# Patient Record
Sex: MALE | Race: WHITE | NOT HISPANIC OR LATINO
[De-identification: names, ages, dates, MRNs, and addresses within clinical notes are randomized per-mention and may not be internally consistent; named-entity substitution may affect disease eponyms.]

---

## 2017-01-17 ENCOUNTER — APPOINTMENT (OUTPATIENT)
Dept: COLORECTAL SURGERY | Facility: CLINIC | Age: 77
End: 2017-01-17

## 2017-01-24 ENCOUNTER — RX RENEWAL (OUTPATIENT)
Age: 77
End: 2017-01-24

## 2017-06-13 ENCOUNTER — RX RENEWAL (OUTPATIENT)
Age: 77
End: 2017-06-13

## 2017-08-10 ENCOUNTER — RX RENEWAL (OUTPATIENT)
Age: 77
End: 2017-08-10

## 2017-09-15 ENCOUNTER — APPOINTMENT (OUTPATIENT)
Dept: FAMILY MEDICINE | Facility: CLINIC | Age: 77
End: 2017-09-15
Payer: MEDICARE

## 2017-09-15 ENCOUNTER — NON-APPOINTMENT (OUTPATIENT)
Age: 77
End: 2017-09-15

## 2017-09-15 VITALS
DIASTOLIC BLOOD PRESSURE: 70 MMHG | SYSTOLIC BLOOD PRESSURE: 122 MMHG | BODY MASS INDEX: 29.03 KG/M2 | HEART RATE: 58 BPM | WEIGHT: 196 LBS | TEMPERATURE: 98.2 F | OXYGEN SATURATION: 98 % | HEIGHT: 69 IN

## 2017-09-15 DIAGNOSIS — N28.1 CYST OF KIDNEY, ACQUIRED: ICD-10-CM

## 2017-09-15 DIAGNOSIS — I65.22 OCCLUSION AND STENOSIS OF LEFT CAROTID ARTERY: ICD-10-CM

## 2017-09-15 DIAGNOSIS — Z85.048 PERSONAL HISTORY OF OTHER MALIGNANT NEOPLASM OF RECTUM, RECTOSIGMOID JUNCTION, AND ANUS: ICD-10-CM

## 2017-09-15 DIAGNOSIS — E55.9 VITAMIN D DEFICIENCY, UNSPECIFIED: ICD-10-CM

## 2017-09-15 DIAGNOSIS — Z23 ENCOUNTER FOR IMMUNIZATION: ICD-10-CM

## 2017-09-15 PROCEDURE — G0008: CPT

## 2017-09-15 PROCEDURE — 90662 IIV NO PRSV INCREASED AG IM: CPT

## 2017-09-15 PROCEDURE — G0009: CPT

## 2017-09-15 PROCEDURE — 90670 PCV13 VACCINE IM: CPT

## 2017-09-15 PROCEDURE — G0439: CPT

## 2017-09-15 RX ORDER — CHLORHEXIDINE GLUCONATE, 0.12% ORAL RINSE 1.2 MG/ML
0.12 SOLUTION DENTAL
Qty: 473 | Refills: 0 | Status: COMPLETED | COMMUNITY
Start: 2017-03-29

## 2017-09-17 LAB
25(OH)D3 SERPL-MCNC: 50.1 NG/ML
ALBUMIN SERPL ELPH-MCNC: 4.6 G/DL
ALP BLD-CCNC: 106 U/L
ALT SERPL-CCNC: 30 U/L
ANION GAP SERPL CALC-SCNC: 21 MMOL/L
APPEARANCE: CLEAR
AST SERPL-CCNC: 28 U/L
BACTERIA: NEGATIVE
BASOPHILS # BLD AUTO: 0.02 K/UL
BASOPHILS NFR BLD AUTO: 0.3 %
BILIRUB SERPL-MCNC: 0.6 MG/DL
BILIRUBIN URINE: NEGATIVE
BLOOD URINE: NEGATIVE
BUN SERPL-MCNC: 28 MG/DL
CALCIUM SERPL-MCNC: 9.7 MG/DL
CEA SERPL-MCNC: 2.4 NG/ML
CHLORIDE SERPL-SCNC: 104 MMOL/L
CHOLEST SERPL-MCNC: 184 MG/DL
CHOLEST/HDLC SERPL: 1.9 RATIO
CO2 SERPL-SCNC: 20 MMOL/L
COLOR: YELLOW
CREAT SERPL-MCNC: 1.15 MG/DL
EOSINOPHIL # BLD AUTO: 0.18 K/UL
EOSINOPHIL NFR BLD AUTO: 2.8 %
GLUCOSE QUALITATIVE U: NORMAL MG/DL
GLUCOSE SERPL-MCNC: 106 MG/DL
HBA1C MFR BLD HPLC: 5.8 %
HCT VFR BLD CALC: 43.7 %
HDLC SERPL-MCNC: 96 MG/DL
HGB BLD-MCNC: 14.1 G/DL
IMM GRANULOCYTES NFR BLD AUTO: 0.2 %
KETONES URINE: NEGATIVE
LDLC SERPL CALC-MCNC: 76 MG/DL
LEUKOCYTE ESTERASE URINE: NEGATIVE
LYMPHOCYTES # BLD AUTO: 1.41 K/UL
LYMPHOCYTES NFR BLD AUTO: 22.1 %
MAN DIFF?: NORMAL
MCHC RBC-ENTMCNC: 30.4 PG
MCHC RBC-ENTMCNC: 32.3 GM/DL
MCV RBC AUTO: 94.2 FL
MICROSCOPIC-UA: NORMAL
MONOCYTES # BLD AUTO: 0.55 K/UL
MONOCYTES NFR BLD AUTO: 8.6 %
NEUTROPHILS # BLD AUTO: 4.21 K/UL
NEUTROPHILS NFR BLD AUTO: 66 %
NITRITE URINE: NEGATIVE
PH URINE: 5.5
PLATELET # BLD AUTO: 173 K/UL
POTASSIUM SERPL-SCNC: 4.9 MMOL/L
PROT SERPL-MCNC: 7.5 G/DL
PROTEIN URINE: ABNORMAL MG/DL
PSA FREE FLD-MCNC: 15.1
PSA FREE SERPL-MCNC: 0.11 NG/ML
PSA SERPL-MCNC: 0.73 NG/ML
RBC # BLD: 4.64 M/UL
RBC # FLD: 14.2 %
RED BLOOD CELLS URINE: 1 /HPF
SODIUM SERPL-SCNC: 145 MMOL/L
SPECIFIC GRAVITY URINE: 1.02
SQUAMOUS EPITHELIAL CELLS: 1 /HPF
TRIGL SERPL-MCNC: 62 MG/DL
TSH SERPL-ACNC: 3.01 UIU/ML
URATE SERPL-MCNC: 8.2 MG/DL
UROBILINOGEN URINE: NORMAL MG/DL
WBC # FLD AUTO: 6.38 K/UL
WHITE BLOOD CELLS URINE: 1 /HPF

## 2017-09-29 ENCOUNTER — APPOINTMENT (OUTPATIENT)
Dept: COLORECTAL SURGERY | Facility: CLINIC | Age: 77
End: 2017-09-29
Payer: MEDICARE

## 2017-09-29 VITALS
SYSTOLIC BLOOD PRESSURE: 120 MMHG | BODY MASS INDEX: 29.03 KG/M2 | HEART RATE: 64 BPM | DIASTOLIC BLOOD PRESSURE: 72 MMHG | HEIGHT: 69 IN | TEMPERATURE: 97.9 F | WEIGHT: 196 LBS

## 2017-09-29 DIAGNOSIS — Z85.048 PERSONAL HISTORY OF OTHER MALIGNANT NEOPLASM OF RECTUM, RECTOSIGMOID JUNCTION, AND ANUS: ICD-10-CM

## 2017-09-29 PROCEDURE — 99214 OFFICE O/P EST MOD 30 MIN: CPT

## 2017-10-05 ENCOUNTER — FORM ENCOUNTER (OUTPATIENT)
Age: 77
End: 2017-10-05

## 2017-10-06 ENCOUNTER — OUTPATIENT (OUTPATIENT)
Dept: OUTPATIENT SERVICES | Facility: HOSPITAL | Age: 77
LOS: 1 days | End: 2017-10-06
Payer: MEDICARE

## 2017-10-06 ENCOUNTER — APPOINTMENT (OUTPATIENT)
Dept: CT IMAGING | Facility: CLINIC | Age: 77
End: 2017-10-06
Payer: MEDICARE

## 2017-10-06 DIAGNOSIS — Z00.8 ENCOUNTER FOR OTHER GENERAL EXAMINATION: ICD-10-CM

## 2017-10-06 PROCEDURE — 74177 CT ABD & PELVIS W/CONTRAST: CPT | Mod: 26

## 2017-10-06 PROCEDURE — 82565 ASSAY OF CREATININE: CPT

## 2017-10-06 PROCEDURE — 74177 CT ABD & PELVIS W/CONTRAST: CPT

## 2017-10-10 ENCOUNTER — OTHER (OUTPATIENT)
Age: 77
End: 2017-10-10

## 2017-10-10 LAB
ALBUMIN SERPL ELPH-MCNC: 4.1 G/DL
ALP BLD-CCNC: 80 U/L
ALT SERPL-CCNC: 25 U/L
ANION GAP SERPL CALC-SCNC: 17 MMOL/L
AST SERPL-CCNC: 24 U/L
BASOPHILS # BLD AUTO: 0.02 K/UL
BASOPHILS NFR BLD AUTO: 0.4 %
BILIRUB DIRECT SERPL-MCNC: 0.2 MG/DL
BILIRUB INDIRECT SERPL-MCNC: 0.5 MG/DL
BILIRUB SERPL-MCNC: 0.7 MG/DL
BUN SERPL-MCNC: 24 MG/DL
CALCIUM SERPL-MCNC: 9.8 MG/DL
CEA SERPL-MCNC: 2.4 NG/ML
CHLORIDE SERPL-SCNC: 99 MMOL/L
CO2 SERPL-SCNC: 23 MMOL/L
CREAT SERPL-MCNC: 1.12 MG/DL
EOSINOPHIL # BLD AUTO: 0.23 K/UL
EOSINOPHIL NFR BLD AUTO: 4.2 %
GLUCOSE SERPL-MCNC: 106 MG/DL
HCT VFR BLD CALC: 41.4 %
HGB BLD-MCNC: 13.5 G/DL
IMM GRANULOCYTES NFR BLD AUTO: 0.5 %
LYMPHOCYTES # BLD AUTO: 1.18 K/UL
LYMPHOCYTES NFR BLD AUTO: 21.5 %
MAN DIFF?: NORMAL
MCHC RBC-ENTMCNC: 30.8 PG
MCHC RBC-ENTMCNC: 32.6 GM/DL
MCV RBC AUTO: 94.3 FL
MONOCYTES # BLD AUTO: 0.51 K/UL
MONOCYTES NFR BLD AUTO: 9.3 %
NEUTROPHILS # BLD AUTO: 3.53 K/UL
NEUTROPHILS NFR BLD AUTO: 64.1 %
PLATELET # BLD AUTO: 128 K/UL
POTASSIUM SERPL-SCNC: 4.8 MMOL/L
PROT SERPL-MCNC: 6.9 G/DL
RBC # BLD: 4.39 M/UL
RBC # FLD: 13.4 %
SODIUM SERPL-SCNC: 139 MMOL/L
WBC # FLD AUTO: 5.5 K/UL

## 2017-12-06 ENCOUNTER — INPATIENT (INPATIENT)
Facility: HOSPITAL | Age: 77
LOS: 6 days | Discharge: HOME | End: 2017-12-13
Attending: COLON & RECTAL SURGERY | Admitting: COLON & RECTAL SURGERY
Payer: MEDICARE

## 2017-12-06 VITALS
RESPIRATION RATE: 16 BRPM | WEIGHT: 186.95 LBS | TEMPERATURE: 99 F | HEART RATE: 75 BPM | OXYGEN SATURATION: 99 % | DIASTOLIC BLOOD PRESSURE: 94 MMHG | HEIGHT: 69 IN | SYSTOLIC BLOOD PRESSURE: 168 MMHG

## 2017-12-06 DIAGNOSIS — K56.609 UNSPECIFIED INTESTINAL OBSTRUCTION, UNSPECIFIED AS TO PARTIAL VERSUS COMPLETE OBSTRUCTION: ICD-10-CM

## 2017-12-06 LAB
ALBUMIN SERPL ELPH-MCNC: 3.9 G/DL — SIGNIFICANT CHANGE UP (ref 3.3–5)
ALP SERPL-CCNC: 106 U/L — SIGNIFICANT CHANGE UP (ref 40–120)
ALT FLD-CCNC: 32 U/L — SIGNIFICANT CHANGE UP (ref 12–78)
ANION GAP SERPL CALC-SCNC: 8 MMOL/L — SIGNIFICANT CHANGE UP (ref 5–17)
APPEARANCE UR: CLEAR — SIGNIFICANT CHANGE UP
APTT BLD: 30.2 SEC — SIGNIFICANT CHANGE UP (ref 27.5–37.4)
AST SERPL-CCNC: 24 U/L — SIGNIFICANT CHANGE UP (ref 15–37)
BACTERIA # UR AUTO: (no result)
BASOPHILS # BLD AUTO: 0.1 K/UL — SIGNIFICANT CHANGE UP (ref 0–0.2)
BASOPHILS NFR BLD AUTO: 0.4 % — SIGNIFICANT CHANGE UP (ref 0–2)
BILIRUB SERPL-MCNC: 0.9 MG/DL — SIGNIFICANT CHANGE UP (ref 0.2–1.2)
BILIRUB UR-MCNC: NEGATIVE — SIGNIFICANT CHANGE UP
BLD GP AB SCN SERPL QL: SIGNIFICANT CHANGE UP
BUN SERPL-MCNC: 22 MG/DL — SIGNIFICANT CHANGE UP (ref 7–23)
CALCIUM SERPL-MCNC: 9.4 MG/DL — SIGNIFICANT CHANGE UP (ref 8.5–10.1)
CHLORIDE SERPL-SCNC: 103 MMOL/L — SIGNIFICANT CHANGE UP (ref 96–108)
CO2 SERPL-SCNC: 28 MMOL/L — SIGNIFICANT CHANGE UP (ref 22–31)
COLOR SPEC: YELLOW — SIGNIFICANT CHANGE UP
CREAT SERPL-MCNC: 0.93 MG/DL — SIGNIFICANT CHANGE UP (ref 0.5–1.3)
DIFF PNL FLD: (no result)
EOSINOPHIL # BLD AUTO: 0 K/UL — SIGNIFICANT CHANGE UP (ref 0–0.5)
EOSINOPHIL NFR BLD AUTO: 0.1 % — SIGNIFICANT CHANGE UP (ref 0–6)
EPI CELLS # UR: SIGNIFICANT CHANGE UP
GLUCOSE SERPL-MCNC: 109 MG/DL — HIGH (ref 70–99)
GLUCOSE UR QL: NEGATIVE MG/DL — SIGNIFICANT CHANGE UP
HCT VFR BLD CALC: 47.2 % — SIGNIFICANT CHANGE UP (ref 39–50)
HGB BLD-MCNC: 16.1 G/DL — SIGNIFICANT CHANGE UP (ref 13–17)
INR BLD: 0.97 RATIO — SIGNIFICANT CHANGE UP (ref 0.88–1.16)
KETONES UR-MCNC: (no result)
LACTATE SERPL-SCNC: 1.2 MMOL/L — SIGNIFICANT CHANGE UP (ref 0.7–2)
LEUKOCYTE ESTERASE UR-ACNC: NEGATIVE — SIGNIFICANT CHANGE UP
LIDOCAIN IGE QN: 105 U/L — SIGNIFICANT CHANGE UP (ref 73–393)
LYMPHOCYTES # BLD AUTO: 1.5 K/UL — SIGNIFICANT CHANGE UP (ref 1–3.3)
LYMPHOCYTES # BLD AUTO: 12.7 % — LOW (ref 13–44)
MAGNESIUM SERPL-MCNC: 2.1 MG/DL — SIGNIFICANT CHANGE UP (ref 1.6–2.6)
MCHC RBC-ENTMCNC: 31.5 PG — SIGNIFICANT CHANGE UP (ref 27–34)
MCHC RBC-ENTMCNC: 34.1 GM/DL — SIGNIFICANT CHANGE UP (ref 32–36)
MCV RBC AUTO: 92.4 FL — SIGNIFICANT CHANGE UP (ref 80–100)
MONOCYTES # BLD AUTO: 0.8 K/UL — SIGNIFICANT CHANGE UP (ref 0–0.9)
MONOCYTES NFR BLD AUTO: 6.8 % — SIGNIFICANT CHANGE UP (ref 2–14)
NEUTROPHILS # BLD AUTO: 9.4 K/UL — HIGH (ref 1.8–7.4)
NEUTROPHILS NFR BLD AUTO: 79.9 % — HIGH (ref 43–77)
NITRITE UR-MCNC: NEGATIVE — SIGNIFICANT CHANGE UP
PH UR: 5 — SIGNIFICANT CHANGE UP (ref 5–8)
PLATELET # BLD AUTO: 146 K/UL — LOW (ref 150–400)
POTASSIUM SERPL-MCNC: 4 MMOL/L — SIGNIFICANT CHANGE UP (ref 3.5–5.3)
POTASSIUM SERPL-SCNC: 4 MMOL/L — SIGNIFICANT CHANGE UP (ref 3.5–5.3)
PROT SERPL-MCNC: 8 GM/DL — SIGNIFICANT CHANGE UP (ref 6–8.3)
PROT UR-MCNC: 100 MG/DL
PROTHROM AB SERPL-ACNC: 10.5 SEC — SIGNIFICANT CHANGE UP (ref 9.8–12.7)
RBC # BLD: 5.1 M/UL — SIGNIFICANT CHANGE UP (ref 4.2–5.8)
RBC # FLD: 12.2 % — SIGNIFICANT CHANGE UP (ref 10.3–14.5)
RBC CASTS # UR COMP ASSIST: (no result) /HPF (ref 0–4)
SODIUM SERPL-SCNC: 139 MMOL/L — SIGNIFICANT CHANGE UP (ref 135–145)
SP GR SPEC: 1.01 — SIGNIFICANT CHANGE UP (ref 1.01–1.02)
TYPE + AB SCN PNL BLD: SIGNIFICANT CHANGE UP
UROBILINOGEN FLD QL: NEGATIVE MG/DL — SIGNIFICANT CHANGE UP
WBC # BLD: 11.7 K/UL — HIGH (ref 3.8–10.5)
WBC # FLD AUTO: 11.7 K/UL — HIGH (ref 3.8–10.5)
WBC UR QL: SIGNIFICANT CHANGE UP

## 2017-12-06 PROCEDURE — 99285 EMERGENCY DEPT VISIT HI MDM: CPT

## 2017-12-06 PROCEDURE — 74022 RADEX COMPL AQT ABD SERIES: CPT | Mod: 26

## 2017-12-06 RX ORDER — AMLODIPINE BESYLATE 2.5 MG/1
1 TABLET ORAL
Qty: 0 | Refills: 0 | COMMUNITY

## 2017-12-06 RX ORDER — ATENOLOL 25 MG/1
50 TABLET ORAL DAILY
Qty: 0 | Refills: 0 | Status: DISCONTINUED | OUTPATIENT
Start: 2017-12-06 | End: 2017-12-13

## 2017-12-06 RX ORDER — MORPHINE SULFATE 50 MG/1
4 CAPSULE, EXTENDED RELEASE ORAL ONCE
Qty: 0 | Refills: 0 | Status: DISCONTINUED | OUTPATIENT
Start: 2017-12-06 | End: 2017-12-06

## 2017-12-06 RX ORDER — MORPHINE SULFATE 50 MG/1
4 CAPSULE, EXTENDED RELEASE ORAL EVERY 4 HOURS
Qty: 0 | Refills: 0 | Status: DISCONTINUED | OUTPATIENT
Start: 2017-12-06 | End: 2017-12-13

## 2017-12-06 RX ORDER — HEPARIN SODIUM 5000 [USP'U]/ML
5000 INJECTION INTRAVENOUS; SUBCUTANEOUS EVERY 12 HOURS
Qty: 0 | Refills: 0 | Status: DISCONTINUED | OUTPATIENT
Start: 2017-12-07 | End: 2017-12-13

## 2017-12-06 RX ORDER — MORPHINE SULFATE 50 MG/1
2 CAPSULE, EXTENDED RELEASE ORAL EVERY 4 HOURS
Qty: 0 | Refills: 0 | Status: DISCONTINUED | OUTPATIENT
Start: 2017-12-06 | End: 2017-12-13

## 2017-12-06 RX ORDER — CHOLECALCIFEROL (VITAMIN D3) 125 MCG
1 CAPSULE ORAL
Qty: 0 | Refills: 0 | COMMUNITY

## 2017-12-06 RX ORDER — SODIUM CHLORIDE 9 MG/ML
1000 INJECTION, SOLUTION INTRAVENOUS
Qty: 0 | Refills: 0 | Status: DISCONTINUED | OUTPATIENT
Start: 2017-12-06 | End: 2017-12-12

## 2017-12-06 RX ORDER — ONDANSETRON 8 MG/1
4 TABLET, FILM COATED ORAL EVERY 6 HOURS
Qty: 0 | Refills: 0 | Status: DISCONTINUED | OUTPATIENT
Start: 2017-12-06 | End: 2017-12-13

## 2017-12-06 RX ORDER — ONDANSETRON 8 MG/1
4 TABLET, FILM COATED ORAL ONCE
Qty: 0 | Refills: 0 | Status: COMPLETED | OUTPATIENT
Start: 2017-12-06 | End: 2017-12-06

## 2017-12-06 RX ORDER — ASPIRIN/CALCIUM CARB/MAGNESIUM 324 MG
1 TABLET ORAL
Qty: 0 | Refills: 0 | COMMUNITY

## 2017-12-06 RX ORDER — MAGNESIUM OXIDE 400 MG ORAL TABLET 241.3 MG
1 TABLET ORAL
Qty: 0 | Refills: 0 | COMMUNITY

## 2017-12-06 RX ORDER — ATENOLOL 25 MG/1
1 TABLET ORAL
Qty: 0 | Refills: 0 | COMMUNITY

## 2017-12-06 RX ORDER — BENZOCAINE AND MENTHOL 5; 1 G/100ML; G/100ML
1 LIQUID ORAL EVERY 4 HOURS
Qty: 0 | Refills: 0 | Status: DISCONTINUED | OUTPATIENT
Start: 2017-12-06 | End: 2017-12-13

## 2017-12-06 RX ORDER — ATORVASTATIN CALCIUM 80 MG/1
1 TABLET, FILM COATED ORAL
Qty: 0 | Refills: 0 | COMMUNITY

## 2017-12-06 RX ORDER — CALCIUM CARBONATE 500(1250)
1 TABLET ORAL
Qty: 0 | Refills: 0 | COMMUNITY

## 2017-12-06 RX ORDER — PANTOPRAZOLE SODIUM 20 MG/1
40 TABLET, DELAYED RELEASE ORAL DAILY
Qty: 0 | Refills: 0 | Status: DISCONTINUED | OUTPATIENT
Start: 2017-12-06 | End: 2017-12-13

## 2017-12-06 RX ADMIN — PANTOPRAZOLE SODIUM 40 MILLIGRAM(S): 20 TABLET, DELAYED RELEASE ORAL at 21:26

## 2017-12-06 RX ADMIN — MORPHINE SULFATE 4 MILLIGRAM(S): 50 CAPSULE, EXTENDED RELEASE ORAL at 21:25

## 2017-12-06 RX ADMIN — SODIUM CHLORIDE 125 MILLILITER(S): 9 INJECTION, SOLUTION INTRAVENOUS at 20:10

## 2017-12-06 RX ADMIN — MORPHINE SULFATE 4 MILLIGRAM(S): 50 CAPSULE, EXTENDED RELEASE ORAL at 21:40

## 2017-12-06 RX ADMIN — ONDANSETRON 4 MILLIGRAM(S): 8 TABLET, FILM COATED ORAL at 17:04

## 2017-12-06 RX ADMIN — BENZOCAINE AND MENTHOL 1 LOZENGE: 5; 1 LIQUID ORAL at 21:27

## 2017-12-06 RX ADMIN — MORPHINE SULFATE 4 MILLIGRAM(S): 50 CAPSULE, EXTENDED RELEASE ORAL at 17:04

## 2017-12-06 RX ADMIN — ATENOLOL 50 MILLIGRAM(S): 25 TABLET ORAL at 21:26

## 2017-12-06 NOTE — H&P ADULT - PROBLEM SELECTOR PLAN 1
Admit to Colorectal service. Dr. Razo  npo, IVF, NGT  axr now and in am.  am labs  dvt prophylaxis  PPI IV  Patient fully understands plan, if does not improve,  will need surgery

## 2017-12-06 NOTE — PATIENT PROFILE ADULT. - NS TRANSFER PATIENT BELONGINGS
Cell Phone/PDA (specify)/Jewelry/Money (specify)/leather jacket, shoes socks, necklace, ring, phone /Clothing

## 2017-12-06 NOTE — H&P ADULT - NSHPPHYSICALEXAM_GEN_ALL_CORE
Vital Signs Last 24 Hrs  T(C): 37 (06 Dec 2017 15:29), Max: 37 (06 Dec 2017 15:29)  T(F): 98.6 (06 Dec 2017 15:29), Max: 98.6 (06 Dec 2017 15:29)  HR: 75 (06 Dec 2017 15:29) (75 - 75)  BP: 168/94 (06 Dec 2017 15:29) (168/94 - 168/94)  BP(mean): --  RR: 16 (06 Dec 2017 15:29) (16 - 16)  SpO2: 99% (06 Dec 2017 15:29) (99% - 99%)    Some discomfort, but pleasant  HEENT WNL  NECK FROM  chest clear  cor rrr  abd soft, scars well healed, generalized tenderness.   no masses  ext no edema

## 2017-12-06 NOTE — H&P ADULT - HISTORY OF PRESENT ILLNESS
78 y/o male, transferred from [facility]. He has a hx of rectal cancer,s/p neoadjuvant chemotherapy, LAR with ileostomy, reversal of ileostomy. His generalized abdominal pain started 4 days ago, crampy and wavelike in nature. Has become progressively worse.   Denies fever, No vomiting, some nausea. +BM on monday and flatus this am.  CT and bloodwork performed at [facility].  denies chest pains or SOB

## 2017-12-06 NOTE — ED ADULT TRIAGE NOTE - CHIEF COMPLAINT QUOTE
pt transferred from [facility] to  for Dr. Gutierrez for small bowel obstruction.

## 2017-12-06 NOTE — ED ADULT NURSE REASSESSMENT NOTE - NS ED NURSE REASSESS COMMENT FT1
Pt received 14 Fr NGT tube to right NARES , 500cc brown emesis noted within 30 minutes , Morphine 4mg IV and Zofran 4mg IV given

## 2017-12-06 NOTE — H&P ADULT - NSHPLABSRESULTS_GEN_ALL_CORE
11.7  )-----16------( 146      ( 06 Dec 2017 16:58 )             47.2      PT/INR - ( 06 Dec 2017 16:58 )   PT: 10.5 sec;   INR: 0.97 ratio         PTT - ( 06 Dec 2017 16:58 )  PTT:30.2 sec    Lactate, Blood (12.06.17 @ 16:58)    Lactate, Blood: 1.2 mmol/L      CT done at outside facility shows findings concerning for closed loop mid small bowel obstruction some distended loops of mid small bowel extending to the surgical site in the right. There is fecal material present in the colon    stable 3.9 infrarenal abdominal aortic aneurysm just below bifurcation.

## 2017-12-06 NOTE — ED PROVIDER NOTE - OBJECTIVE STATEMENT
78 yo male PMH HTN, HLD, colon cancer s/p resection, sent here by Dr. Razo for SBO found on CT at [facility].   Reports abdominal pain worsening since Sunday, nausea, and sweats.  States he only has one BM per week.  Has not eaten or drank anything today.  Denies fever, chills, CP, SOB, vomiting. 76 yo male PMH HTN, HLD, colon cancer s/p resection, sent here by Dr. Razo for SBO found on CT at [facility].   Reports abdominal pain worsening since Sunday, nausea, and sweats.  States he only has one BM per week, last one was on Monday.  Has not eaten or drank anything today.  Denies fever, chills, CP, SOB, vomiting.

## 2017-12-07 LAB
ANION GAP SERPL CALC-SCNC: 4 MMOL/L — LOW (ref 5–17)
APTT BLD: 28.1 SEC — SIGNIFICANT CHANGE UP (ref 27.5–37.4)
BASOPHILS # BLD AUTO: 0.1 K/UL — SIGNIFICANT CHANGE UP (ref 0–0.2)
BASOPHILS NFR BLD AUTO: 0.9 % — SIGNIFICANT CHANGE UP (ref 0–2)
BLD GP AB SCN SERPL QL: SIGNIFICANT CHANGE UP
BUN SERPL-MCNC: 25 MG/DL — HIGH (ref 7–23)
CALCIUM SERPL-MCNC: 8.7 MG/DL — SIGNIFICANT CHANGE UP (ref 8.5–10.1)
CHLORIDE SERPL-SCNC: 104 MMOL/L — SIGNIFICANT CHANGE UP (ref 96–108)
CO2 SERPL-SCNC: 32 MMOL/L — HIGH (ref 22–31)
CREAT SERPL-MCNC: 1.07 MG/DL — SIGNIFICANT CHANGE UP (ref 0.5–1.3)
EOSINOPHIL # BLD AUTO: 0.1 K/UL — SIGNIFICANT CHANGE UP (ref 0–0.5)
EOSINOPHIL NFR BLD AUTO: 1.3 % — SIGNIFICANT CHANGE UP (ref 0–6)
GLUCOSE SERPL-MCNC: 100 MG/DL — HIGH (ref 70–99)
HCT VFR BLD CALC: 40.3 % — SIGNIFICANT CHANGE UP (ref 39–50)
HGB BLD-MCNC: 13.6 G/DL — SIGNIFICANT CHANGE UP (ref 13–17)
INR BLD: 1.04 RATIO — SIGNIFICANT CHANGE UP (ref 0.88–1.16)
LYMPHOCYTES # BLD AUTO: 1.2 K/UL — SIGNIFICANT CHANGE UP (ref 1–3.3)
LYMPHOCYTES # BLD AUTO: 15.5 % — SIGNIFICANT CHANGE UP (ref 13–44)
MCHC RBC-ENTMCNC: 31.6 PG — SIGNIFICANT CHANGE UP (ref 27–34)
MCHC RBC-ENTMCNC: 33.7 GM/DL — SIGNIFICANT CHANGE UP (ref 32–36)
MCV RBC AUTO: 93.8 FL — SIGNIFICANT CHANGE UP (ref 80–100)
MONOCYTES # BLD AUTO: 0.9 K/UL — SIGNIFICANT CHANGE UP (ref 0–0.9)
MONOCYTES NFR BLD AUTO: 11.4 % — SIGNIFICANT CHANGE UP (ref 2–14)
NEUTROPHILS # BLD AUTO: 5.5 K/UL — SIGNIFICANT CHANGE UP (ref 1.8–7.4)
NEUTROPHILS NFR BLD AUTO: 70.9 % — SIGNIFICANT CHANGE UP (ref 43–77)
PLATELET # BLD AUTO: 115 K/UL — LOW (ref 150–400)
POTASSIUM SERPL-MCNC: 3.9 MMOL/L — SIGNIFICANT CHANGE UP (ref 3.5–5.3)
POTASSIUM SERPL-SCNC: 3.9 MMOL/L — SIGNIFICANT CHANGE UP (ref 3.5–5.3)
PROTHROM AB SERPL-ACNC: 11.2 SEC — SIGNIFICANT CHANGE UP (ref 9.8–12.7)
RBC # BLD: 4.3 M/UL — SIGNIFICANT CHANGE UP (ref 4.2–5.8)
RBC # FLD: 12.4 % — SIGNIFICANT CHANGE UP (ref 10.3–14.5)
SODIUM SERPL-SCNC: 140 MMOL/L — SIGNIFICANT CHANGE UP (ref 135–145)
TYPE + AB SCN PNL BLD: SIGNIFICANT CHANGE UP
WBC # BLD: 7.8 K/UL — SIGNIFICANT CHANGE UP (ref 3.8–10.5)
WBC # FLD AUTO: 7.8 K/UL — SIGNIFICANT CHANGE UP (ref 3.8–10.5)

## 2017-12-07 PROCEDURE — 93010 ELECTROCARDIOGRAM REPORT: CPT

## 2017-12-07 PROCEDURE — 74022 RADEX COMPL AQT ABD SERIES: CPT | Mod: 26

## 2017-12-07 RX ORDER — AMLODIPINE BESYLATE 2.5 MG/1
5 TABLET ORAL DAILY
Qty: 0 | Refills: 0 | Status: DISCONTINUED | OUTPATIENT
Start: 2017-12-07 | End: 2017-12-13

## 2017-12-07 RX ORDER — ACETAMINOPHEN 500 MG
650 TABLET ORAL EVERY 6 HOURS
Qty: 0 | Refills: 0 | Status: DISCONTINUED | OUTPATIENT
Start: 2017-12-07 | End: 2017-12-13

## 2017-12-07 RX ADMIN — SODIUM CHLORIDE 125 MILLILITER(S): 9 INJECTION, SOLUTION INTRAVENOUS at 20:01

## 2017-12-07 RX ADMIN — HEPARIN SODIUM 5000 UNIT(S): 5000 INJECTION INTRAVENOUS; SUBCUTANEOUS at 17:51

## 2017-12-07 RX ADMIN — ONDANSETRON 4 MILLIGRAM(S): 8 TABLET, FILM COATED ORAL at 17:51

## 2017-12-07 RX ADMIN — BENZOCAINE AND MENTHOL 1 LOZENGE: 5; 1 LIQUID ORAL at 20:48

## 2017-12-07 RX ADMIN — Medication 650 MILLIGRAM(S): at 10:15

## 2017-12-07 RX ADMIN — MORPHINE SULFATE 4 MILLIGRAM(S): 50 CAPSULE, EXTENDED RELEASE ORAL at 15:56

## 2017-12-07 RX ADMIN — BENZOCAINE AND MENTHOL 1 LOZENGE: 5; 1 LIQUID ORAL at 06:27

## 2017-12-07 RX ADMIN — MORPHINE SULFATE 4 MILLIGRAM(S): 50 CAPSULE, EXTENDED RELEASE ORAL at 06:26

## 2017-12-07 RX ADMIN — PANTOPRAZOLE SODIUM 40 MILLIGRAM(S): 20 TABLET, DELAYED RELEASE ORAL at 11:44

## 2017-12-07 RX ADMIN — MORPHINE SULFATE 4 MILLIGRAM(S): 50 CAPSULE, EXTENDED RELEASE ORAL at 00:00

## 2017-12-07 RX ADMIN — Medication 650 MILLIGRAM(S): at 09:16

## 2017-12-07 RX ADMIN — MORPHINE SULFATE 2 MILLIGRAM(S): 50 CAPSULE, EXTENDED RELEASE ORAL at 22:35

## 2017-12-07 RX ADMIN — MORPHINE SULFATE 2 MILLIGRAM(S): 50 CAPSULE, EXTENDED RELEASE ORAL at 23:05

## 2017-12-07 RX ADMIN — AMLODIPINE BESYLATE 5 MILLIGRAM(S): 2.5 TABLET ORAL at 15:49

## 2017-12-07 RX ADMIN — MORPHINE SULFATE 4 MILLIGRAM(S): 50 CAPSULE, EXTENDED RELEASE ORAL at 02:45

## 2017-12-07 RX ADMIN — SODIUM CHLORIDE 125 MILLILITER(S): 9 INJECTION, SOLUTION INTRAVENOUS at 11:02

## 2017-12-07 RX ADMIN — HEPARIN SODIUM 5000 UNIT(S): 5000 INJECTION INTRAVENOUS; SUBCUTANEOUS at 06:16

## 2017-12-07 RX ADMIN — SODIUM CHLORIDE 125 MILLILITER(S): 9 INJECTION, SOLUTION INTRAVENOUS at 02:45

## 2017-12-07 RX ADMIN — BENZOCAINE AND MENTHOL 1 LOZENGE: 5; 1 LIQUID ORAL at 13:51

## 2017-12-07 RX ADMIN — MORPHINE SULFATE 4 MILLIGRAM(S): 50 CAPSULE, EXTENDED RELEASE ORAL at 02:39

## 2017-12-07 RX ADMIN — BENZOCAINE AND MENTHOL 1 LOZENGE: 5; 1 LIQUID ORAL at 02:39

## 2017-12-07 NOTE — CONSULT NOTE ADULT - ASSESSMENT
76 yo male with pmh rectal CA sp aurea-adjuvant chemo now in remission, LAR with ileostomy s/p reversal in 2015, s/p Bilateral 2012/2015, HTN presented with pain 4 days PTA. Found to have SBO now appears to be improving with NGT.       #SBO  NGT to suction, NPO, IVF, electrolyte correction, ambulation, serial abd exams and continued care per primary team    #HTN  Recc: continue norvasc    #CEA  Recc: should be on ASA, start ASA 81    #Hxof Rectal CA s/p chemo with ileostomy  In remission  Outpt follow up with Dr Inman    DVT px

## 2017-12-07 NOTE — CONSULT NOTE ADULT - SUBJECTIVE AND OBJECTIVE BOX
PCP: Jessy Gordon    76 yo male with pmh rectal CA sp aurea-adjuvant chemo now in remission, LAR with ileostomy s/p reversal in 2015, s/p Bilateral 2012/2015, HTN presented with pain 4 days PTA. Found to have SBO now appears to be improving with NGT.     Denies N/V. Abd cramping slowly subsiding. Passing flatus. No BM.   No CP/SOB.     Social: former smoker  Shx: LAR s/p ileostomy with reversal, CEA  Fhx: no hx of 1st degree relatives with CA    ICU Vital Signs Last 24 Hrs  T(C): 36.5 (07 Dec 2017 13:18), Max: 37.2 (06 Dec 2017 19:00)  T(F): 97.7 (07 Dec 2017 13:18), Max: 99 (07 Dec 2017 04:53)  HR: 75 (07 Dec 2017 13:18) (52 - 77)  BP: 141/72 (07 Dec 2017 13:18) (130/66 - 157/87)  BP(mean): --  ABP: --  ABP(mean): --  RR: 16 (07 Dec 2017 13:18) (16 - 17)  SpO2: 95% (07 Dec 2017 13:18) (94% - 98%)  meds: reviewed        PHYSICAL EXAM:    Constitutional: NAD, awake and alert, well-developed  HEENT: PERR, EOMI, Normal Hearing, MMM, NGT draning brown/greenish fluid-to suction  Neck: Soft and supple, No LAD, No JVD  Respiratory: Breath sounds are clear bilaterally, No wheezing, rales or rhonchi  Cardiovascular: S1 and S2, regular rate and rhythm, no Murmurs, gallops or rubs  Gastrointestinal: Bowel Sounds present, soft, nontender, nondistended, abd scar noted at right quadrant, sluggish bowel sounds  Extremities: No peripheral edema  Vascular: 2+ peripheral pulses  Neurological: A/O x 3, no focal deficits  Musculoskeletal: 5/5 strength b/l upper and lower extremities  Skin: No rashes          LABS: All Labs Reviewed:                        13.6   7.8   )-----------( 115      ( 07 Dec 2017 05:33 )             40.3     12-07    140  |  104  |  25<H>  ----------------------------<  100<H>  3.9   |  32<H>  |  1.07    Ca    8.7      07 Dec 2017 05:33  Mg     2.1     12-06    TPro  8.0  /  Alb  3.9  /  TBili  0.9  /  DBili  x   /  AST  24  /  ALT  32  /  AlkPhos  106  12-06    PT/INR - ( 07 Dec 2017 05:33 )   PT: 11.2 sec;   INR: 1.04 ratio         PTT - ( 07 Dec 2017 05:33 )  PTT:28.1 sec          Blood Culture:             < from: Xray Abdomen Flat & Upright Decub (12.07.17 @ 10:05) >  IMPRESSION:    Dilated small bowel loops suspicious for small bowel obstruction with   improvement on the December 07, 201    < end of copied text >

## 2017-12-07 NOTE — PROGRESS NOTE ADULT - SUBJECTIVE AND OBJECTIVE BOX
HD 1, feels better, no n/v.   Has passed flatus X 5 times since NGT inserted yesterday. No BM  Abd pain improved.    Vital Signs Last 24 Hrs  T(C): 37.2 (07 Dec 2017 04:53), Max: 37.2 (06 Dec 2017 19:00)  T(F): 99 (07 Dec 2017 04:53), Max: 99 (07 Dec 2017 04:53)  HR: 52 (07 Dec 2017 04:53) (52 - 77)  BP: 137/725 (07 Dec 2017 04:53) (130/66 - 168/94)  BP(mean): --  RR: 17 (07 Dec 2017 04:53) (16 - 17)  SpO2: 94% (07 Dec 2017 04:53) (94% - 99%)    NGT 1000ml in the past 24 hrs.    NAD  abd soft, ND, NT                          13.6   7.8   )-----------( 115      ( 07 Dec 2017 05:33 )             40.3   12-07    140  |  104  |  25<H>  ----------------------------<  100<H>  3.9   |  32<H>  |  1.07    Ca    8.7      07 Dec 2017 05:33  Mg     2.1     12-06    TPro  8.0  /  Alb  3.9  /  TBili  0.9  /  DBili  x   /  AST  24  /  ALT  32  /  AlkPhos  106  12-06

## 2017-12-08 LAB
ANION GAP SERPL CALC-SCNC: 5 MMOL/L — SIGNIFICANT CHANGE UP (ref 5–17)
BASOPHILS # BLD AUTO: 0.1 K/UL — SIGNIFICANT CHANGE UP (ref 0–0.2)
BASOPHILS NFR BLD AUTO: 0.8 % — SIGNIFICANT CHANGE UP (ref 0–2)
BUN SERPL-MCNC: 21 MG/DL — SIGNIFICANT CHANGE UP (ref 7–23)
CALCIUM SERPL-MCNC: 8.9 MG/DL — SIGNIFICANT CHANGE UP (ref 8.5–10.1)
CHLORIDE SERPL-SCNC: 102 MMOL/L — SIGNIFICANT CHANGE UP (ref 96–108)
CO2 SERPL-SCNC: 32 MMOL/L — HIGH (ref 22–31)
CREAT SERPL-MCNC: 0.96 MG/DL — SIGNIFICANT CHANGE UP (ref 0.5–1.3)
CULTURE RESULTS: NO GROWTH — SIGNIFICANT CHANGE UP
EOSINOPHIL # BLD AUTO: 0.1 K/UL — SIGNIFICANT CHANGE UP (ref 0–0.5)
EOSINOPHIL NFR BLD AUTO: 1.4 % — SIGNIFICANT CHANGE UP (ref 0–6)
GLUCOSE SERPL-MCNC: 81 MG/DL — SIGNIFICANT CHANGE UP (ref 70–99)
HCT VFR BLD CALC: 40.6 % — SIGNIFICANT CHANGE UP (ref 39–50)
HGB BLD-MCNC: 12.9 G/DL — LOW (ref 13–17)
LYMPHOCYTES # BLD AUTO: 0.8 K/UL — LOW (ref 1–3.3)
LYMPHOCYTES # BLD AUTO: 11.5 % — LOW (ref 13–44)
MCHC RBC-ENTMCNC: 30.5 PG — SIGNIFICANT CHANGE UP (ref 27–34)
MCHC RBC-ENTMCNC: 31.8 GM/DL — LOW (ref 32–36)
MCV RBC AUTO: 96.1 FL — SIGNIFICANT CHANGE UP (ref 80–100)
MONOCYTES # BLD AUTO: 0.5 K/UL — SIGNIFICANT CHANGE UP (ref 0–0.9)
MONOCYTES NFR BLD AUTO: 7.4 % — SIGNIFICANT CHANGE UP (ref 2–14)
NEUTROPHILS # BLD AUTO: 5.6 K/UL — SIGNIFICANT CHANGE UP (ref 1.8–7.4)
NEUTROPHILS NFR BLD AUTO: 78.9 % — HIGH (ref 43–77)
PLATELET # BLD AUTO: 102 K/UL — LOW (ref 150–400)
POTASSIUM SERPL-MCNC: 4.2 MMOL/L — SIGNIFICANT CHANGE UP (ref 3.5–5.3)
POTASSIUM SERPL-SCNC: 4.2 MMOL/L — SIGNIFICANT CHANGE UP (ref 3.5–5.3)
RBC # BLD: 4.22 M/UL — SIGNIFICANT CHANGE UP (ref 4.2–5.8)
RBC # FLD: 12.6 % — SIGNIFICANT CHANGE UP (ref 10.3–14.5)
SODIUM SERPL-SCNC: 139 MMOL/L — SIGNIFICANT CHANGE UP (ref 135–145)
SPECIMEN SOURCE: SIGNIFICANT CHANGE UP
WBC # BLD: 7.1 K/UL — SIGNIFICANT CHANGE UP (ref 3.8–10.5)
WBC # FLD AUTO: 7.1 K/UL — SIGNIFICANT CHANGE UP (ref 3.8–10.5)

## 2017-12-08 PROCEDURE — 74177 CT ABD & PELVIS W/CONTRAST: CPT | Mod: 26

## 2017-12-08 RX ORDER — MORPHINE SULFATE 50 MG/1
2 CAPSULE, EXTENDED RELEASE ORAL ONCE
Qty: 0 | Refills: 0 | Status: DISCONTINUED | OUTPATIENT
Start: 2017-12-08 | End: 2017-12-08

## 2017-12-08 RX ADMIN — MORPHINE SULFATE 4 MILLIGRAM(S): 50 CAPSULE, EXTENDED RELEASE ORAL at 11:34

## 2017-12-08 RX ADMIN — SODIUM CHLORIDE 125 MILLILITER(S): 9 INJECTION, SOLUTION INTRAVENOUS at 13:10

## 2017-12-08 RX ADMIN — BENZOCAINE AND MENTHOL 1 LOZENGE: 5; 1 LIQUID ORAL at 00:42

## 2017-12-08 RX ADMIN — MORPHINE SULFATE 4 MILLIGRAM(S): 50 CAPSULE, EXTENDED RELEASE ORAL at 21:21

## 2017-12-08 RX ADMIN — PANTOPRAZOLE SODIUM 40 MILLIGRAM(S): 20 TABLET, DELAYED RELEASE ORAL at 11:25

## 2017-12-08 RX ADMIN — HEPARIN SODIUM 5000 UNIT(S): 5000 INJECTION INTRAVENOUS; SUBCUTANEOUS at 05:34

## 2017-12-08 RX ADMIN — MORPHINE SULFATE 4 MILLIGRAM(S): 50 CAPSULE, EXTENDED RELEASE ORAL at 11:20

## 2017-12-08 RX ADMIN — ATENOLOL 50 MILLIGRAM(S): 25 TABLET ORAL at 03:16

## 2017-12-08 RX ADMIN — Medication 650 MILLIGRAM(S): at 03:48

## 2017-12-08 RX ADMIN — HEPARIN SODIUM 5000 UNIT(S): 5000 INJECTION INTRAVENOUS; SUBCUTANEOUS at 18:40

## 2017-12-08 RX ADMIN — MORPHINE SULFATE 4 MILLIGRAM(S): 50 CAPSULE, EXTENDED RELEASE ORAL at 16:55

## 2017-12-08 RX ADMIN — AMLODIPINE BESYLATE 5 MILLIGRAM(S): 2.5 TABLET ORAL at 05:34

## 2017-12-08 RX ADMIN — MORPHINE SULFATE 2 MILLIGRAM(S): 50 CAPSULE, EXTENDED RELEASE ORAL at 01:09

## 2017-12-08 RX ADMIN — ATENOLOL 50 MILLIGRAM(S): 25 TABLET ORAL at 21:21

## 2017-12-08 RX ADMIN — MORPHINE SULFATE 2 MILLIGRAM(S): 50 CAPSULE, EXTENDED RELEASE ORAL at 00:39

## 2017-12-08 RX ADMIN — SODIUM CHLORIDE 125 MILLILITER(S): 9 INJECTION, SOLUTION INTRAVENOUS at 04:32

## 2017-12-08 RX ADMIN — MORPHINE SULFATE 4 MILLIGRAM(S): 50 CAPSULE, EXTENDED RELEASE ORAL at 16:40

## 2017-12-08 RX ADMIN — BENZOCAINE AND MENTHOL 1 LOZENGE: 5; 1 LIQUID ORAL at 22:24

## 2017-12-08 RX ADMIN — Medication 650 MILLIGRAM(S): at 03:18

## 2017-12-08 RX ADMIN — SODIUM CHLORIDE 125 MILLILITER(S): 9 INJECTION, SOLUTION INTRAVENOUS at 21:24

## 2017-12-08 RX ADMIN — MORPHINE SULFATE 4 MILLIGRAM(S): 50 CAPSULE, EXTENDED RELEASE ORAL at 21:51

## 2017-12-08 NOTE — CONSULT NOTE ADULT - SUBJECTIVE AND OBJECTIVE BOX
PCP: Jessy Gordon    76 yo male with pmh rectal CA sp aurea-adjuvant chemo now in remission, LAR with ileostomy s/p reversal in 2015, s/p Bilateral 2012/2015, HTN presented with pain 4 days PTA. Found to have SBO now appears to be improving with NGT.     Denies N/V. Abd cramping slowly subsiding. Passing flatus. No BM.   No CP/SOB.     Social: former smoker  Shx: LAR s/p ileostomy with reversal, CEA  Fhx: no hx of 1st degree relatives with CA    ICU Vital Signs Last 24 Hrs  T(C): 36.6 (08 Dec 2017 05:27), Max: 37 (08 Dec 2017 03:15)  T(F): 97.9 (08 Dec 2017 05:27), Max: 98.6 (08 Dec 2017 03:15)  HR: 58 (08 Dec 2017 05:27) (56 - 85)  BP: 152/76 (08 Dec 2017 05:27) (123/54 - 153/81)  BP(mean): --  ABP: --  ABP(mean): --  RR: 16 (08 Dec 2017 05:27) (16 - 17)  SpO2: 94% (08 Dec 2017 05:27) (91% - 96%)  ec 2017 13:18) (94% - 98%)  meds: reviewed        PHYSICAL EXAM:    Constitutional: NAD, awake and alert, well-developed  HEENT: PERR, EOMI, Normal Hearing, MMM, NGT draning brown/greenish fluid-to suction  Neck: Soft and supple, No LAD, No JVD  Respiratory: Breath sounds are clear bilaterally, No wheezing, rales or rhonchi  Cardiovascular: S1 and S2, regular rate and rhythm, no Murmurs, gallops or rubs  Gastrointestinal: Bowel Sounds present, soft, nontender, nondistended, abd scar noted at right quadrant, sluggish bowel sounds  Extremities: No peripheral edema  Vascular: 2+ peripheral pulses  Neurological: A/O x 3, no focal deficits  Musculoskeletal: 5/5 strength b/l upper and lower extremities  Skin: No rashes          LABS: All Labs Reviewed:                        13.6   7.8   )-----------( 115      ( 07 Dec 2017 05:33 )             40.3     12-07    140  |  104  |  25<H>  ----------------------------<  100<H>  3.9   |  32<H>  |  1.07    Ca    8.7      07 Dec 2017 05:33  Mg     2.1     12-06    TPro  8.0  /  Alb  3.9  /  TBili  0.9  /  DBili  x   /  AST  24  /  ALT  32  /  AlkPhos  106  12-06    PT/INR - ( 07 Dec 2017 05:33 )   PT: 11.2 sec;   INR: 1.04 ratio         PTT - ( 07 Dec 2017 05:33 )  PTT:28.1 sec          Blood Culture:             < from: Xray Abdomen Flat & Upright Decub (12.07.17 @ 10:05) >  IMPRESSION:    Dilated small bowel loops suspicious for small bowel obstruction with   improvement on the December 07, 201    < end of copied text >

## 2017-12-08 NOTE — CONSULT NOTE ADULT - ASSESSMENT
78 yo male with pmh rectal CA sp aurea-adjuvant chemo now in remission, LAR with ileostomy s/p reversal in 2015, s/p Bilateral 2012/2015, HTN presented with pain 4 days PTA. Found to have SBO now appears to be improving with NGT.       #SBO  NGT to suction, NPO, IVF, electrolyte correction, ambulation, serial abd exams and continued care per primary team. for CT AP today    #HTN  Recc: continue norvasc    #CEA  Recc: should be on ASA, start ASA 81    #Hxof Rectal CA s/p chemo with ileostomy  In remission  Outpt follow up with Dr Inman    DVT px

## 2017-12-08 NOTE — PROGRESS NOTE ADULT - SUBJECTIVE AND OBJECTIVE BOX
HD 2, passing gas. no n/v  NGT fell out last night and successfully re-inserted.    Vital Signs Last 24 Hrs  T(C): 36.6 (08 Dec 2017 05:27), Max: 37 (08 Dec 2017 03:15)  T(F): 97.9 (08 Dec 2017 05:27), Max: 98.6 (08 Dec 2017 03:15)  HR: 58 (08 Dec 2017 05:27) (56 - 85)  BP: 152/76 (08 Dec 2017 05:27) (123/54 - 153/81)  BP(mean): --  RR: 16 (08 Dec 2017 05:27) (16 - 17)  SpO2: 94% (08 Dec 2017 05:27) (91% - 96%)    NAD  abd soft                          12.9   7.1   )-----------( 102      ( 08 Dec 2017 05:34 )             40.6   12-08    139  |  102  |  21  ----------------------------<  81  4.2   |  32<H>  |  0.96    Ca    8.9      08 Dec 2017 05:34  Mg     2.1     12-06    TPro  8.0  /  Alb  3.9  /  TBili  0.9  /  DBili  x   /  AST  24  /  ALT  32  /  AlkPhos  106  12-06

## 2017-12-09 LAB
ANION GAP SERPL CALC-SCNC: 7 MMOL/L — SIGNIFICANT CHANGE UP (ref 5–17)
BASOPHILS # BLD AUTO: 0.1 K/UL — SIGNIFICANT CHANGE UP (ref 0–0.2)
BASOPHILS NFR BLD AUTO: 1.5 % — SIGNIFICANT CHANGE UP (ref 0–2)
BUN SERPL-MCNC: 22 MG/DL — SIGNIFICANT CHANGE UP (ref 7–23)
CALCIUM SERPL-MCNC: 8.3 MG/DL — LOW (ref 8.5–10.1)
CHLORIDE SERPL-SCNC: 101 MMOL/L — SIGNIFICANT CHANGE UP (ref 96–108)
CO2 SERPL-SCNC: 31 MMOL/L — SIGNIFICANT CHANGE UP (ref 22–31)
CREAT SERPL-MCNC: 0.91 MG/DL — SIGNIFICANT CHANGE UP (ref 0.5–1.3)
EOSINOPHIL # BLD AUTO: 0.1 K/UL — SIGNIFICANT CHANGE UP (ref 0–0.5)
EOSINOPHIL NFR BLD AUTO: 2 % — SIGNIFICANT CHANGE UP (ref 0–6)
GLUCOSE SERPL-MCNC: 76 MG/DL — SIGNIFICANT CHANGE UP (ref 70–99)
HCT VFR BLD CALC: 37.2 % — LOW (ref 39–50)
HGB BLD-MCNC: 12.3 G/DL — LOW (ref 13–17)
LYMPHOCYTES # BLD AUTO: 1 K/UL — SIGNIFICANT CHANGE UP (ref 1–3.3)
LYMPHOCYTES # BLD AUTO: 14.8 % — SIGNIFICANT CHANGE UP (ref 13–44)
MCHC RBC-ENTMCNC: 31 PG — SIGNIFICANT CHANGE UP (ref 27–34)
MCHC RBC-ENTMCNC: 33.2 GM/DL — SIGNIFICANT CHANGE UP (ref 32–36)
MCV RBC AUTO: 93.5 FL — SIGNIFICANT CHANGE UP (ref 80–100)
MONOCYTES # BLD AUTO: 0.7 K/UL — SIGNIFICANT CHANGE UP (ref 0–0.9)
MONOCYTES NFR BLD AUTO: 10.1 % — SIGNIFICANT CHANGE UP (ref 2–14)
NEUTROPHILS # BLD AUTO: 4.9 K/UL — SIGNIFICANT CHANGE UP (ref 1.8–7.4)
NEUTROPHILS NFR BLD AUTO: 71.7 % — SIGNIFICANT CHANGE UP (ref 43–77)
PLATELET # BLD AUTO: 112 K/UL — LOW (ref 150–400)
POTASSIUM SERPL-MCNC: 3.8 MMOL/L — SIGNIFICANT CHANGE UP (ref 3.5–5.3)
POTASSIUM SERPL-SCNC: 3.8 MMOL/L — SIGNIFICANT CHANGE UP (ref 3.5–5.3)
RBC # BLD: 3.97 M/UL — LOW (ref 4.2–5.8)
RBC # FLD: 12.2 % — SIGNIFICANT CHANGE UP (ref 10.3–14.5)
SODIUM SERPL-SCNC: 139 MMOL/L — SIGNIFICANT CHANGE UP (ref 135–145)
WBC # BLD: 6.9 K/UL — SIGNIFICANT CHANGE UP (ref 3.8–10.5)
WBC # FLD AUTO: 6.9 K/UL — SIGNIFICANT CHANGE UP (ref 3.8–10.5)

## 2017-12-09 PROCEDURE — 74022 RADEX COMPL AQT ABD SERIES: CPT | Mod: 26

## 2017-12-09 RX ADMIN — SODIUM CHLORIDE 125 MILLILITER(S): 9 INJECTION, SOLUTION INTRAVENOUS at 04:37

## 2017-12-09 RX ADMIN — HEPARIN SODIUM 5000 UNIT(S): 5000 INJECTION INTRAVENOUS; SUBCUTANEOUS at 17:43

## 2017-12-09 RX ADMIN — PANTOPRAZOLE SODIUM 40 MILLIGRAM(S): 20 TABLET, DELAYED RELEASE ORAL at 13:10

## 2017-12-09 RX ADMIN — ATENOLOL 50 MILLIGRAM(S): 25 TABLET ORAL at 21:13

## 2017-12-09 RX ADMIN — AMLODIPINE BESYLATE 5 MILLIGRAM(S): 2.5 TABLET ORAL at 05:21

## 2017-12-09 RX ADMIN — HEPARIN SODIUM 5000 UNIT(S): 5000 INJECTION INTRAVENOUS; SUBCUTANEOUS at 05:21

## 2017-12-09 RX ADMIN — MORPHINE SULFATE 4 MILLIGRAM(S): 50 CAPSULE, EXTENDED RELEASE ORAL at 01:16

## 2017-12-09 RX ADMIN — MORPHINE SULFATE 4 MILLIGRAM(S): 50 CAPSULE, EXTENDED RELEASE ORAL at 00:46

## 2017-12-09 RX ADMIN — SODIUM CHLORIDE 125 MILLILITER(S): 9 INJECTION, SOLUTION INTRAVENOUS at 13:10

## 2017-12-09 RX ADMIN — SODIUM CHLORIDE 125 MILLILITER(S): 9 INJECTION, SOLUTION INTRAVENOUS at 20:12

## 2017-12-09 NOTE — PROGRESS NOTE ADULT - ASSESSMENT
A/P - pSBO, clinically improving.    CT A/P report reviewed.  F/u AXR today and tomorrow.  If no improvement, likely ex-lap, VICTORIA, possible SBR on Monday.

## 2017-12-09 NOTE — PROGRESS NOTE ADULT - SUBJECTIVE AND OBJECTIVE BOX
Reports passing large amounts of flatus with improvement in pain. Residual soreness in RLQ.     MEDICATIONS  (STANDING):  amLODIPine   Tablet 5 milliGRAM(s) Oral daily  ATENolol  Tablet 50 milliGRAM(s) Oral daily  heparin  Injectable 5000 Unit(s) SubCutaneous every 12 hours  lactated ringers. 1000 milliLiter(s) (125 mL/Hr) IV Continuous <Continuous>  pantoprazole  Injectable 40 milliGRAM(s) IV Push daily    MEDICATIONS  (PRN):  acetaminophen   Tablet. 650 milliGRAM(s) Oral every 6 hours PRN Mild Pain (1 - 3)  benzocaine 15 mG/menthol 3.6 mG Lozenge 1 Lozenge Oral every 4 hours PRN Sore Throat  morphine  - Injectable 2 milliGRAM(s) IV Push every 4 hours PRN Mild Pain (1 - 3)  morphine  - Injectable 4 milliGRAM(s) IV Push every 4 hours PRN Moderate Pain (4 - 6)  ondansetron Injectable 4 milliGRAM(s) IV Push every 6 hours PRN Nausea    Vital Signs Last 24 Hrs  T(C): 36.5 (09 Dec 2017 04:37), Max: 37.1 (08 Dec 2017 13:35)  T(F): 97.7 (09 Dec 2017 04:37), Max: 98.7 (08 Dec 2017 13:35)  HR: 82 (09 Dec 2017 04:37) (77 - 88)  BP: 152/79 (09 Dec 2017 04:37) (102/61 - 176/84)  BP(mean): --  RR: 16 (09 Dec 2017 04:37) (16 - 17)  SpO2: 92% (09 Dec 2017 04:37) (90% - 95%)  I&O's Detail    08 Dec 2017 07:01  -  09 Dec 2017 07:00  --------------------------------------------------------  IN:    lactated ringers.: 2855 mL  Total IN: 2855 mL    OUT:    Nasoenteral Tube: 750 mL    Voided: 970 mL  Total OUT: 1720 mL    Total NET: 1135 mL    NAD  ABD exam :soft, NT, mild distention                        12.3   6.9   )-----------( 112      ( 09 Dec 2017 06:22 )             37.2     12-09    139  |  101  |  22  ----------------------------<  76  3.8   |  31  |  0.91    Ca    8.3<L>      09 Dec 2017 06:22

## 2017-12-10 LAB
ANION GAP SERPL CALC-SCNC: 9 MMOL/L — SIGNIFICANT CHANGE UP (ref 5–17)
BLD GP AB SCN SERPL QL: SIGNIFICANT CHANGE UP
BUN SERPL-MCNC: 16 MG/DL — SIGNIFICANT CHANGE UP (ref 7–23)
CALCIUM SERPL-MCNC: 8.5 MG/DL — SIGNIFICANT CHANGE UP (ref 8.5–10.1)
CHLORIDE SERPL-SCNC: 100 MMOL/L — SIGNIFICANT CHANGE UP (ref 96–108)
CO2 SERPL-SCNC: 30 MMOL/L — SIGNIFICANT CHANGE UP (ref 22–31)
CREAT SERPL-MCNC: 0.76 MG/DL — SIGNIFICANT CHANGE UP (ref 0.5–1.3)
GLUCOSE SERPL-MCNC: 68 MG/DL — LOW (ref 70–99)
HCT VFR BLD CALC: 39.1 % — SIGNIFICANT CHANGE UP (ref 39–50)
HGB BLD-MCNC: 13 G/DL — SIGNIFICANT CHANGE UP (ref 13–17)
MAGNESIUM SERPL-MCNC: 1.6 MG/DL — SIGNIFICANT CHANGE UP (ref 1.6–2.6)
MCHC RBC-ENTMCNC: 31 PG — SIGNIFICANT CHANGE UP (ref 27–34)
MCHC RBC-ENTMCNC: 33.3 GM/DL — SIGNIFICANT CHANGE UP (ref 32–36)
MCV RBC AUTO: 92.9 FL — SIGNIFICANT CHANGE UP (ref 80–100)
PHOSPHATE SERPL-MCNC: 2.5 MG/DL — SIGNIFICANT CHANGE UP (ref 2.5–4.5)
PLATELET # BLD AUTO: 102 K/UL — LOW (ref 150–400)
POTASSIUM SERPL-MCNC: 3.8 MMOL/L — SIGNIFICANT CHANGE UP (ref 3.5–5.3)
POTASSIUM SERPL-SCNC: 3.8 MMOL/L — SIGNIFICANT CHANGE UP (ref 3.5–5.3)
RBC # BLD: 4.21 M/UL — SIGNIFICANT CHANGE UP (ref 4.2–5.8)
RBC # FLD: 11.8 % — SIGNIFICANT CHANGE UP (ref 10.3–14.5)
SODIUM SERPL-SCNC: 139 MMOL/L — SIGNIFICANT CHANGE UP (ref 135–145)
TYPE + AB SCN PNL BLD: SIGNIFICANT CHANGE UP
WBC # BLD: 6.8 K/UL — SIGNIFICANT CHANGE UP (ref 3.8–10.5)
WBC # FLD AUTO: 6.8 K/UL — SIGNIFICANT CHANGE UP (ref 3.8–10.5)

## 2017-12-10 PROCEDURE — 74022 RADEX COMPL AQT ABD SERIES: CPT | Mod: 26

## 2017-12-10 RX ADMIN — PANTOPRAZOLE SODIUM 40 MILLIGRAM(S): 20 TABLET, DELAYED RELEASE ORAL at 11:15

## 2017-12-10 RX ADMIN — SODIUM CHLORIDE 125 MILLILITER(S): 9 INJECTION, SOLUTION INTRAVENOUS at 05:16

## 2017-12-10 RX ADMIN — AMLODIPINE BESYLATE 5 MILLIGRAM(S): 2.5 TABLET ORAL at 05:09

## 2017-12-10 RX ADMIN — HEPARIN SODIUM 5000 UNIT(S): 5000 INJECTION INTRAVENOUS; SUBCUTANEOUS at 18:06

## 2017-12-10 RX ADMIN — HEPARIN SODIUM 5000 UNIT(S): 5000 INJECTION INTRAVENOUS; SUBCUTANEOUS at 05:09

## 2017-12-10 RX ADMIN — SODIUM CHLORIDE 125 MILLILITER(S): 9 INJECTION, SOLUTION INTRAVENOUS at 18:08

## 2017-12-10 RX ADMIN — BENZOCAINE AND MENTHOL 1 LOZENGE: 5; 1 LIQUID ORAL at 08:28

## 2017-12-10 RX ADMIN — ATENOLOL 50 MILLIGRAM(S): 25 TABLET ORAL at 21:13

## 2017-12-10 NOTE — PROGRESS NOTE ADULT - SUBJECTIVE AND OBJECTIVE BOX
No c/o. No significant change from yesterday. Cont passing flatus, minimal abd pain.    MEDICATIONS  (STANDING):  amLODIPine   Tablet 5 milliGRAM(s) Oral daily  ATENolol  Tablet 50 milliGRAM(s) Oral daily  heparin  Injectable 5000 Unit(s) SubCutaneous every 12 hours  lactated ringers. 1000 milliLiter(s) (125 mL/Hr) IV Continuous <Continuous>  pantoprazole  Injectable 40 milliGRAM(s) IV Push daily    MEDICATIONS  (PRN):  acetaminophen   Tablet. 650 milliGRAM(s) Oral every 6 hours PRN Mild Pain (1 - 3)  benzocaine 15 mG/menthol 3.6 mG Lozenge 1 Lozenge Oral every 4 hours PRN Sore Throat  morphine  - Injectable 2 milliGRAM(s) IV Push every 4 hours PRN Mild Pain (1 - 3)  morphine  - Injectable 4 milliGRAM(s) IV Push every 4 hours PRN Moderate Pain (4 - 6)  ondansetron Injectable 4 milliGRAM(s) IV Push every 6 hours PRN Nausea    Vital Signs Last 24 Hrs  T(C): 36.1 (10 Dec 2017 04:20), Max: 36.9 (09 Dec 2017 13:17)  T(F): 97 (10 Dec 2017 04:20), Max: 98.5 (09 Dec 2017 13:17)  HR: 55 (10 Dec 2017 04:20) (55 - 84)  BP: 157/91 (10 Dec 2017 04:20) (142/73 - 157/91)  BP(mean): --  RR: 18 (10 Dec 2017 04:20) (18 - 18)  SpO2: 91% (10 Dec 2017 04:20) (90% - 91%)  I&O's Detail    09 Dec 2017 07:01  -  10 Dec 2017 07:00  --------------------------------------------------------  IN:    IV PiggyBack: 100 mL    lactated ringers.: 2790 mL  Total IN: 2890 mL    OUT:    Nasoenteral Tube: 500 mL    Voided: 350 mL  Total OUT: 850 mL    Total NET: 2040 mL    NAD  ABD exam :soft, NTND                        13.0   6.8   )-----------( 102      ( 10 Dec 2017 05:18 )             39.1     12-10    139  |  100  |  16  ----------------------------<  68<L>  3.8   |  30  |  0.76    Ca    8.5      10 Dec 2017 05:18  Phos  2.5     12-10  Mg     1.6     12-10

## 2017-12-10 NOTE — PROGRESS NOTE ADULT - ASSESSMENT
A/P - pSBO    F/U AXR today.  Overall, xrays demonstrate improvement in pSBO, however, given persistence of SBO on CT, may still need ex-lap, VICTORIA, possible SBR for tomorrow.  Pt aware of possible OR tomorrow.  Cont NPO, NGT, IVF.  Type and screen.

## 2017-12-11 LAB
ANION GAP SERPL CALC-SCNC: 10 MMOL/L — SIGNIFICANT CHANGE UP (ref 5–17)
BUN SERPL-MCNC: 14 MG/DL — SIGNIFICANT CHANGE UP (ref 7–23)
CALCIUM SERPL-MCNC: 8.9 MG/DL — SIGNIFICANT CHANGE UP (ref 8.5–10.1)
CHLORIDE SERPL-SCNC: 101 MMOL/L — SIGNIFICANT CHANGE UP (ref 96–108)
CO2 SERPL-SCNC: 28 MMOL/L — SIGNIFICANT CHANGE UP (ref 22–31)
CREAT SERPL-MCNC: 0.83 MG/DL — SIGNIFICANT CHANGE UP (ref 0.5–1.3)
GLUCOSE SERPL-MCNC: 74 MG/DL — SIGNIFICANT CHANGE UP (ref 70–99)
MAGNESIUM SERPL-MCNC: 1.7 MG/DL — SIGNIFICANT CHANGE UP (ref 1.6–2.6)
PHOSPHATE SERPL-MCNC: 3 MG/DL — SIGNIFICANT CHANGE UP (ref 2.5–4.5)
POTASSIUM SERPL-MCNC: 4 MMOL/L — SIGNIFICANT CHANGE UP (ref 3.5–5.3)
POTASSIUM SERPL-SCNC: 4 MMOL/L — SIGNIFICANT CHANGE UP (ref 3.5–5.3)
SODIUM SERPL-SCNC: 139 MMOL/L — SIGNIFICANT CHANGE UP (ref 135–145)

## 2017-12-11 PROCEDURE — 74177 CT ABD & PELVIS W/CONTRAST: CPT | Mod: 26

## 2017-12-11 RX ADMIN — PANTOPRAZOLE SODIUM 40 MILLIGRAM(S): 20 TABLET, DELAYED RELEASE ORAL at 13:16

## 2017-12-11 RX ADMIN — HEPARIN SODIUM 5000 UNIT(S): 5000 INJECTION INTRAVENOUS; SUBCUTANEOUS at 06:02

## 2017-12-11 RX ADMIN — BENZOCAINE AND MENTHOL 1 LOZENGE: 5; 1 LIQUID ORAL at 22:35

## 2017-12-11 RX ADMIN — SODIUM CHLORIDE 125 MILLILITER(S): 9 INJECTION, SOLUTION INTRAVENOUS at 06:02

## 2017-12-11 RX ADMIN — AMLODIPINE BESYLATE 5 MILLIGRAM(S): 2.5 TABLET ORAL at 06:02

## 2017-12-11 RX ADMIN — HEPARIN SODIUM 5000 UNIT(S): 5000 INJECTION INTRAVENOUS; SUBCUTANEOUS at 18:18

## 2017-12-11 RX ADMIN — SODIUM CHLORIDE 50 MILLILITER(S): 9 INJECTION, SOLUTION INTRAVENOUS at 18:18

## 2017-12-11 NOTE — PROGRESS NOTE ADULT - SUBJECTIVE AND OBJECTIVE BOX
feels much better this am. drinking contrast for CT scan  ++flatus and now Bms loose  no n/v    Vital Signs Last 24 Hrs  T(C): 36.7 (11 Dec 2017 12:54), Max: 36.9 (10 Dec 2017 21:11)  T(F): 98 (11 Dec 2017 12:54), Max: 98.5 (10 Dec 2017 21:11)  HR: 85 (11 Dec 2017 12:54) (59 - 85)  BP: 152/77 (11 Dec 2017 12:54) (152/77 - 164/74)  BP(mean): --  RR: 18 (11 Dec 2017 12:54) (17 - 18)  SpO2: 94% (11 Dec 2017 12:54) (94% - 96%)    NAd  abd soft                          13.0   6.8   )-----------( 102      ( 10 Dec 2017 05:18 )             39.1   12-11    139  |  101  |  14  ----------------------------<  74  4.0   |  28  |  0.83    Ca    8.9      11 Dec 2017 06:29  Phos  3.0     12-11  Mg     1.7     12-11    < from: CT Abdomen and Pelvis w/ Oral Cont and w/ IV Cont (12.11.17 @ 12:01) >  INTERPRETATION:  Clinical information: Proctectomy and ileostomy   reversal, now with small bowel obstruction    COMPARISON: December 08, 2017    PROCEDURE:   CT of the Abdomen and Pelvis was performed with intravenous contrast.   Intravenous contrast: 90 ml Omnipaque 350. 10 ml discarded.  Oral contrast: positive contrast was administered.  Sagittal and coronal reformats were performed.    FINDINGS:    LOWER CHEST: Coronary artery calcifications.    LIVER: Within normal limits.  SPLEEN: Within normal limits.  PANCREAS: Within normal limits.  GALLBLADDER: Within normal limits.  BILE DUCTS: Normal caliber.  ADRENALS: Within normal limits.  KIDNEYS/URETERS: Right lower pole renal cyst. Small left renal lesions   which are not adequately characterized.    RETROPERITONEUM: No lymphadenopathy.    VESSELS:  Unchanged 4.2 cm infrarenal abdominal aortic aneurysm.   Extensive atherosclerotic arterial calcification.    BOWEL: Resolution of previously seen small bowel obstruction with no   dilated small bowel loops. Contrast is seen throughout the colon and into   the rectum. Small bowel anastomosis in the ileum.. Appendix normal.  PERITONEUM: Trace ascites. No pneumoperitoneum.    REPRODUCTIVE ORGANS: Mildly enlarged prostate.  BLADDER: Within normal limits.    ABDOMINAL WALL: Midline postsurgical changes. Small ventral hernia   containing a loop of nonobstructed small intestine.  BONES: No acute bony abnormality.    IMPRESSION: Resolution of small bowel obstruction.                      < end of copied text >

## 2017-12-12 LAB
ANION GAP SERPL CALC-SCNC: 8 MMOL/L — SIGNIFICANT CHANGE UP (ref 5–17)
BUN SERPL-MCNC: 10 MG/DL — SIGNIFICANT CHANGE UP (ref 7–23)
CALCIUM SERPL-MCNC: 9 MG/DL — SIGNIFICANT CHANGE UP (ref 8.5–10.1)
CHLORIDE SERPL-SCNC: 102 MMOL/L — SIGNIFICANT CHANGE UP (ref 96–108)
CO2 SERPL-SCNC: 32 MMOL/L — HIGH (ref 22–31)
CREAT SERPL-MCNC: 0.83 MG/DL — SIGNIFICANT CHANGE UP (ref 0.5–1.3)
GLUCOSE SERPL-MCNC: 93 MG/DL — SIGNIFICANT CHANGE UP (ref 70–99)
MAGNESIUM SERPL-MCNC: 1.7 MG/DL — SIGNIFICANT CHANGE UP (ref 1.6–2.6)
PHOSPHATE SERPL-MCNC: 3.1 MG/DL — SIGNIFICANT CHANGE UP (ref 2.5–4.5)
POTASSIUM SERPL-MCNC: 3.7 MMOL/L — SIGNIFICANT CHANGE UP (ref 3.5–5.3)
POTASSIUM SERPL-SCNC: 3.7 MMOL/L — SIGNIFICANT CHANGE UP (ref 3.5–5.3)
SODIUM SERPL-SCNC: 142 MMOL/L — SIGNIFICANT CHANGE UP (ref 135–145)

## 2017-12-12 RX ADMIN — ATENOLOL 50 MILLIGRAM(S): 25 TABLET ORAL at 21:14

## 2017-12-12 RX ADMIN — AMLODIPINE BESYLATE 5 MILLIGRAM(S): 2.5 TABLET ORAL at 05:40

## 2017-12-12 RX ADMIN — HEPARIN SODIUM 5000 UNIT(S): 5000 INJECTION INTRAVENOUS; SUBCUTANEOUS at 05:40

## 2017-12-12 RX ADMIN — PANTOPRAZOLE SODIUM 40 MILLIGRAM(S): 20 TABLET, DELAYED RELEASE ORAL at 12:52

## 2017-12-12 NOTE — PROGRESS NOTE ADULT - ASSESSMENT
Resolved small bowel obstruction with non operative management. Continue to advance diet. Discharge home tomorrow

## 2017-12-12 NOTE — PROGRESS NOTE ADULT - SUBJECTIVE AND OBJECTIVE BOX
Doing well. Tolerating liquids and continues to have bowel function. No abdominal pain    Exam:  Vital Signs Last 24 Hrs  T(C): 36.2 (12 Dec 2017 05:21), Max: 36.9 (11 Dec 2017 20:29)  T(F): 97.2 (12 Dec 2017 05:21), Max: 98.4 (11 Dec 2017 20:29)  HR: 61 (12 Dec 2017 05:41) (57 - 85)  BP: 152/79 (12 Dec 2017 05:41) (134/71 - 154/86)  RR: 16 (12 Dec 2017 05:21) (16 - 18)  SpO2: 98% (12 Dec 2017 05:21) (93% - 98%)    In no distress  Abdomen soft, non tender, non distended    12-12    142  |  102  |  10  ----------------------------<  93  3.7   |  32<H>  |  0.83    Ca    9.0      12 Dec 2017 05:38  Phos  3.1     12-12  Mg     1.7     12-12

## 2017-12-13 ENCOUNTER — TRANSCRIPTION ENCOUNTER (OUTPATIENT)
Age: 77
End: 2017-12-13

## 2017-12-13 VITALS — HEART RATE: 64 BPM

## 2017-12-13 RX ORDER — LOPERAMIDE HCL 2 MG
4 TABLET ORAL EVERY 4 HOURS
Qty: 0 | Refills: 0 | Status: DISCONTINUED | OUTPATIENT
Start: 2017-12-13 | End: 2017-12-13

## 2017-12-13 RX ORDER — PSYLLIUM SEED (WITH DEXTROSE)
1 POWDER (GRAM) ORAL ONCE
Qty: 0 | Refills: 0 | Status: COMPLETED | OUTPATIENT
Start: 2017-12-13 | End: 2017-12-13

## 2017-12-13 RX ADMIN — AMLODIPINE BESYLATE 5 MILLIGRAM(S): 2.5 TABLET ORAL at 05:21

## 2017-12-13 RX ADMIN — Medication 4 MILLIGRAM(S): at 10:18

## 2017-12-13 RX ADMIN — Medication 1 PACKET(S): at 10:18

## 2017-12-13 NOTE — DISCHARGE NOTE ADULT - CARE PROVIDER_API CALL
Cesar Razo), ColonRectal Surgery; Surgery  [address] [place] [address]  Phone: [phone]  Fax: [phone]

## 2017-12-13 NOTE — DISCHARGE NOTE ADULT - PATIENT PORTAL LINK FT
“You can access the [facility] Patient Portal, offered by [facility], by registering with the following website: [email or web address]”

## 2017-12-13 NOTE — DISCHARGE NOTE ADULT - CARE PLAN
Principal Discharge DX:	Small bowel obstruction  Goal:	Recover  Instructions for follow-up, activity and diet:	High fiber diet as per usual routine

## 2017-12-13 NOTE — DISCHARGE NOTE ADULT - MEDICATION SUMMARY - MEDICATIONS TO TAKE
I will START or STAY ON the medications listed below when I get home from the hospital:    aspirin 81 mg oral tablet  -- 1 tab(s) by mouth once a day  -- Indication: For Home med    Os-Sukhdeep 500 (1250 mg calcium carbonate) oral tablet  -- 1 tab(s) by mouth once a day  -- Indication: For Home med    Imodium 2 mg oral capsule  -- 2 cap(s) by mouth once a day  -- Indication: For Home med    Imodium 2 mg oral capsule  -- 1 cap(s) by mouth once a day (at bedtime)  -- Indication: For Home med    Lipitor 20 mg oral tablet  -- 1 tab(s) by mouth once a day (at bedtime)  -- Indication: For Home med    atenolol 50 mg oral tablet  -- 1 tab(s) by mouth once a day (at bedtime)  -- Indication: For Home med    Norvasc 5 mg oral tablet  -- 1 tab(s) by mouth once a day  -- Indication: For Home med    magnesium oxide 500 mg oral tablet  -- 1 tab(s) by mouth once a day  -- Indication: For Home med    biotin 1000 mcg oral tablet  -- 1 tab(s) by mouth once a day  -- Indication: For Home med    cholecalciferol 1000 intl units oral tablet  -- 1 tab(s) by mouth once a day  -- Indication: For Home med

## 2017-12-17 ENCOUNTER — INPATIENT (INPATIENT)
Facility: HOSPITAL | Age: 77
LOS: 7 days | Discharge: HOME | End: 2017-12-25
Attending: COLON & RECTAL SURGERY | Admitting: SURGERY
Payer: MEDICARE

## 2017-12-17 VITALS
WEIGHT: 182.1 LBS | HEART RATE: 67 BPM | RESPIRATION RATE: 18 BRPM | TEMPERATURE: 98 F | DIASTOLIC BLOOD PRESSURE: 82 MMHG | OXYGEN SATURATION: 99 % | SYSTOLIC BLOOD PRESSURE: 139 MMHG

## 2017-12-17 DIAGNOSIS — Z90.49 ACQUIRED ABSENCE OF OTHER SPECIFIED PARTS OF DIGESTIVE TRACT: Chronic | ICD-10-CM

## 2017-12-17 DIAGNOSIS — Z98.890 OTHER SPECIFIED POSTPROCEDURAL STATES: Chronic | ICD-10-CM

## 2017-12-17 LAB
ALBUMIN SERPL ELPH-MCNC: 3.6 G/DL — SIGNIFICANT CHANGE UP (ref 3.3–5)
ALP SERPL-CCNC: 142 U/L — HIGH (ref 40–120)
ALT FLD-CCNC: 52 U/L — SIGNIFICANT CHANGE UP (ref 12–78)
ANION GAP SERPL CALC-SCNC: 7 MMOL/L — SIGNIFICANT CHANGE UP (ref 5–17)
APPEARANCE UR: CLEAR — SIGNIFICANT CHANGE UP
APTT BLD: 28.7 SEC — SIGNIFICANT CHANGE UP (ref 27.5–37.4)
AST SERPL-CCNC: 55 U/L — HIGH (ref 15–37)
BACTERIA # UR AUTO: (no result)
BASOPHILS # BLD AUTO: 0.1 K/UL — SIGNIFICANT CHANGE UP (ref 0–0.2)
BASOPHILS NFR BLD AUTO: 1 % — SIGNIFICANT CHANGE UP (ref 0–2)
BILIRUB SERPL-MCNC: 0.4 MG/DL — SIGNIFICANT CHANGE UP (ref 0.2–1.2)
BILIRUB UR-MCNC: NEGATIVE — SIGNIFICANT CHANGE UP
BUN SERPL-MCNC: 35 MG/DL — HIGH (ref 7–23)
CALCIUM SERPL-MCNC: 9.2 MG/DL — SIGNIFICANT CHANGE UP (ref 8.5–10.1)
CHLORIDE SERPL-SCNC: 100 MMOL/L — SIGNIFICANT CHANGE UP (ref 96–108)
CO2 SERPL-SCNC: 33 MMOL/L — HIGH (ref 22–31)
COLOR SPEC: YELLOW — SIGNIFICANT CHANGE UP
CREAT SERPL-MCNC: 1.32 MG/DL — HIGH (ref 0.5–1.3)
DIFF PNL FLD: NEGATIVE — SIGNIFICANT CHANGE UP
EOSINOPHIL # BLD AUTO: 0.3 K/UL — SIGNIFICANT CHANGE UP (ref 0–0.5)
EOSINOPHIL NFR BLD AUTO: 3.4 % — SIGNIFICANT CHANGE UP (ref 0–6)
EPI CELLS # UR: SIGNIFICANT CHANGE UP
GLUCOSE SERPL-MCNC: 119 MG/DL — HIGH (ref 70–99)
GLUCOSE UR QL: NEGATIVE MG/DL — SIGNIFICANT CHANGE UP
HCT VFR BLD CALC: 43.8 % — SIGNIFICANT CHANGE UP (ref 39–50)
HGB BLD-MCNC: 14.3 G/DL — SIGNIFICANT CHANGE UP (ref 13–17)
HYALINE CASTS # UR AUTO: (no result) /LPF
INR BLD: 0.95 RATIO — SIGNIFICANT CHANGE UP (ref 0.88–1.16)
KETONES UR-MCNC: NEGATIVE — SIGNIFICANT CHANGE UP
LEUKOCYTE ESTERASE UR-ACNC: NEGATIVE — SIGNIFICANT CHANGE UP
LYMPHOCYTES # BLD AUTO: 1.4 K/UL — SIGNIFICANT CHANGE UP (ref 1–3.3)
LYMPHOCYTES # BLD AUTO: 16.6 % — SIGNIFICANT CHANGE UP (ref 13–44)
MCHC RBC-ENTMCNC: 30.6 PG — SIGNIFICANT CHANGE UP (ref 27–34)
MCHC RBC-ENTMCNC: 32.6 GM/DL — SIGNIFICANT CHANGE UP (ref 32–36)
MCV RBC AUTO: 93.8 FL — SIGNIFICANT CHANGE UP (ref 80–100)
MONOCYTES # BLD AUTO: 0.8 K/UL — SIGNIFICANT CHANGE UP (ref 0–0.9)
MONOCYTES NFR BLD AUTO: 9.9 % — SIGNIFICANT CHANGE UP (ref 2–14)
NEUTROPHILS # BLD AUTO: 5.8 K/UL — SIGNIFICANT CHANGE UP (ref 1.8–7.4)
NEUTROPHILS NFR BLD AUTO: 69.1 % — SIGNIFICANT CHANGE UP (ref 43–77)
NITRITE UR-MCNC: NEGATIVE — SIGNIFICANT CHANGE UP
PH UR: 8 — SIGNIFICANT CHANGE UP (ref 5–8)
PLATELET # BLD AUTO: 211 K/UL — SIGNIFICANT CHANGE UP (ref 150–400)
POTASSIUM SERPL-MCNC: 4.5 MMOL/L — SIGNIFICANT CHANGE UP (ref 3.5–5.3)
POTASSIUM SERPL-SCNC: 4.5 MMOL/L — SIGNIFICANT CHANGE UP (ref 3.5–5.3)
PROT SERPL-MCNC: 7.7 GM/DL — SIGNIFICANT CHANGE UP (ref 6–8.3)
PROT UR-MCNC: 15 MG/DL
PROTHROM AB SERPL-ACNC: 10.2 SEC — SIGNIFICANT CHANGE UP (ref 9.8–12.7)
RBC # BLD: 4.67 M/UL — SIGNIFICANT CHANGE UP (ref 4.2–5.8)
RBC # FLD: 12.2 % — SIGNIFICANT CHANGE UP (ref 10.3–14.5)
RBC CASTS # UR COMP ASSIST: SIGNIFICANT CHANGE UP /HPF (ref 0–4)
SODIUM SERPL-SCNC: 140 MMOL/L — SIGNIFICANT CHANGE UP (ref 135–145)
SP GR SPEC: 1.01 — SIGNIFICANT CHANGE UP (ref 1.01–1.02)
UROBILINOGEN FLD QL: NEGATIVE MG/DL — SIGNIFICANT CHANGE UP
WBC # BLD: 8.4 K/UL — SIGNIFICANT CHANGE UP (ref 3.8–10.5)
WBC # FLD AUTO: 8.4 K/UL — SIGNIFICANT CHANGE UP (ref 3.8–10.5)
WBC UR QL: SIGNIFICANT CHANGE UP

## 2017-12-17 PROCEDURE — 74022 RADEX COMPL AQT ABD SERIES: CPT | Mod: 26

## 2017-12-17 PROCEDURE — 93010 ELECTROCARDIOGRAM REPORT: CPT

## 2017-12-17 PROCEDURE — 74177 CT ABD & PELVIS W/CONTRAST: CPT | Mod: 26

## 2017-12-17 PROCEDURE — 99233 SBSQ HOSP IP/OBS HIGH 50: CPT

## 2017-12-17 PROCEDURE — 99285 EMERGENCY DEPT VISIT HI MDM: CPT

## 2017-12-17 RX ORDER — ONDANSETRON 8 MG/1
4 TABLET, FILM COATED ORAL EVERY 6 HOURS
Qty: 0 | Refills: 0 | Status: DISCONTINUED | OUTPATIENT
Start: 2017-12-17 | End: 2017-12-20

## 2017-12-17 RX ORDER — ATENOLOL 25 MG/1
50 TABLET ORAL AT BEDTIME
Qty: 0 | Refills: 0 | Status: DISCONTINUED | OUTPATIENT
Start: 2017-12-17 | End: 2017-12-20

## 2017-12-17 RX ORDER — MORPHINE SULFATE 50 MG/1
2 CAPSULE, EXTENDED RELEASE ORAL EVERY 4 HOURS
Qty: 0 | Refills: 0 | Status: DISCONTINUED | OUTPATIENT
Start: 2017-12-17 | End: 2017-12-20

## 2017-12-17 RX ORDER — ONDANSETRON 8 MG/1
4 TABLET, FILM COATED ORAL ONCE
Qty: 0 | Refills: 0 | Status: COMPLETED | OUTPATIENT
Start: 2017-12-17 | End: 2017-12-17

## 2017-12-17 RX ORDER — MORPHINE SULFATE 50 MG/1
4 CAPSULE, EXTENDED RELEASE ORAL ONCE
Qty: 0 | Refills: 0 | Status: DISCONTINUED | OUTPATIENT
Start: 2017-12-17 | End: 2017-12-17

## 2017-12-17 RX ORDER — SODIUM CHLORIDE 9 MG/ML
1000 INJECTION INTRAMUSCULAR; INTRAVENOUS; SUBCUTANEOUS ONCE
Qty: 0 | Refills: 0 | Status: COMPLETED | OUTPATIENT
Start: 2017-12-17 | End: 2017-12-17

## 2017-12-17 RX ORDER — HEPARIN SODIUM 5000 [USP'U]/ML
5000 INJECTION INTRAVENOUS; SUBCUTANEOUS EVERY 8 HOURS
Qty: 0 | Refills: 0 | Status: DISCONTINUED | OUTPATIENT
Start: 2017-12-17 | End: 2017-12-20

## 2017-12-17 RX ORDER — SODIUM CHLORIDE 9 MG/ML
1000 INJECTION, SOLUTION INTRAVENOUS
Qty: 0 | Refills: 0 | Status: DISCONTINUED | OUTPATIENT
Start: 2017-12-17 | End: 2017-12-20

## 2017-12-17 RX ORDER — AMLODIPINE BESYLATE 2.5 MG/1
5 TABLET ORAL DAILY
Qty: 0 | Refills: 0 | Status: DISCONTINUED | OUTPATIENT
Start: 2017-12-17 | End: 2017-12-20

## 2017-12-17 RX ADMIN — MORPHINE SULFATE 2 MILLIGRAM(S): 50 CAPSULE, EXTENDED RELEASE ORAL at 23:46

## 2017-12-17 RX ADMIN — SODIUM CHLORIDE 100 MILLILITER(S): 9 INJECTION, SOLUTION INTRAVENOUS at 22:49

## 2017-12-17 RX ADMIN — ONDANSETRON 4 MILLIGRAM(S): 8 TABLET, FILM COATED ORAL at 23:32

## 2017-12-17 RX ADMIN — SODIUM CHLORIDE 1000 MILLILITER(S): 9 INJECTION INTRAMUSCULAR; INTRAVENOUS; SUBCUTANEOUS at 18:16

## 2017-12-17 RX ADMIN — ONDANSETRON 4 MILLIGRAM(S): 8 TABLET, FILM COATED ORAL at 18:15

## 2017-12-17 RX ADMIN — MORPHINE SULFATE 4 MILLIGRAM(S): 50 CAPSULE, EXTENDED RELEASE ORAL at 18:25

## 2017-12-17 RX ADMIN — MORPHINE SULFATE 2 MILLIGRAM(S): 50 CAPSULE, EXTENDED RELEASE ORAL at 23:31

## 2017-12-17 RX ADMIN — HEPARIN SODIUM 5000 UNIT(S): 5000 INJECTION INTRAVENOUS; SUBCUTANEOUS at 23:42

## 2017-12-17 NOTE — H&P ADULT - NSHPLABSRESULTS_GEN_ALL_CORE
14.3   8.4   )-----------( 211      ( 17 Dec 2017 18:08 )             43.8   12-17    140  |  100  |  35<H>  ----------------------------<  119<H>  4.5   |  33<H>  |  1.32<H>    Ca    9.2      17 Dec 2017 18:08    TPro  7.7  /  Alb  3.6  /  TBili  0.4  /  DBili  x   /  AST  55<H>  /  ALT  52  /  AlkPhos  142<H>  12-17    < from: CT Abdomen and Pelvis w/ Oral Cont and w/ IV Cont (12.17.17 @ 20:08) >      PROCEDURE DATE:  12/17/2017      INTERPRETATION:  CLINICAL INFORMATION: Right abdominal pain. History of small bowel obstruction.    COMPARISON: 12/11/2017    FINDINGS:    LOWER CHEST: Within normal limits.    LIVER: Within normal limits.  BILE DUCTS: Normal caliber.  GALLBLADDER: Within normal limits.  SPLEEN: Within normal limits.  PANCREAS: Within normal limits.  ADRENALS: Within normal limits.  KIDNEYS/URETERS: Periosteal sized bilateral renal cysts without interval   change. BLADDER: Within normal limits.  REPRODUCTIVE ORGANS: Unremarkable.     BOWEL: There is markedly dilated small bowel loops with a transition point in the right anterior abdomen near the midline (2-80, 602-52). The most distal segments of the distended small bowel is a sacralized (60   1-79).. Appendix is normal.  PERITONEUM: Trace ascites in the right lower abdomen (601-59).  VESSELS:  Within normal limits.  RETROPERITONEUM: No lymphadenopathy.    ABDOMINAL WALL: Within normal limits.  BONES: Within normal limits.    IMPRESSION: Recurrent small bowel obstruction with the transition point in the right anterior abdomen.    < end of copied text >

## 2017-12-17 NOTE — ED PROVIDER NOTE - OBJECTIVE STATEMENT
76 y/o M with Hx of colon CA (treated with radiation) s/p partial colon resection (2015), HTN, HLD, aortic aneurysm presents to ED for evaluation of diffuse abd pain beginning last night. Pain radiates up. Pt reports nausea, vomiting, diaphoresis. Pt denies fever, chills, dysuria, hematuria, numbness, weakness, blood in stools, dark stool. Pain is improved after vomiting. Pt was seen at  ED 2 weeks ago for SBO and was discharged 4 days ago. Pt did not take any pain medication at home. Pt reports that current sx are similar to that of previous SBO. Pt contacted Dr. Rioseria his surgeon which prompted him to seek evaluation. Former Smoker (quit in 1982). Social alcohol use.

## 2017-12-17 NOTE — ED PROVIDER NOTE - MEDICAL DECISION MAKING DETAILS
76 y/o with h/o colon CA s/p resection, multiple SBOs presents with abd pain consistent with previous SBO, most likely SBO. Consider other intraabdominal pathologies including colitis and diverticulitis. Will obtain labs, CT abd/pelvis, give pain medications as well as anti-nausea medication and reassess. 76 y/o with h/o colon CA s/p resection, multiple SBOs presents with abd pain consistent with previous SBO, most likely SBO. Consider other intraabdominal pathologies including but not limited to colitis and diverticulitis. Will obtain labs, CT abd/pelvis, give pain medications as well as anti-nausea medication and reassess.

## 2017-12-17 NOTE — ED PROVIDER NOTE - GASTROINTESTINAL, MLM
Abdomen is soft, non distended with multiple well healed surgical scars. +diffuse abdominal TTP with voluntary guarding.

## 2017-12-17 NOTE — PROVIDER CONTACT NOTE (OTHER) - SITUATION
Calling Dr. Stack's answering service to request call back. Calling Dr. Stack's answering service to request call back.    Dr. Stover returned phone call @ [phone].

## 2017-12-17 NOTE — ED PROVIDER NOTE - CARDIAC, MLM
Normal rate, regular rhythm.  Heart sounds S1, S2.  No murmurs, rubs or gallops. 2+ radial pulses b/l.

## 2017-12-17 NOTE — ED ADULT NURSE NOTE - OBJECTIVE STATEMENT
Pt c/o upper abdominal pain. Pt states he was discharged 12/13/17 for SBO and states this pain feels "the same". Pt has hyper active bowel sounds in all 4 quads

## 2017-12-17 NOTE — PATIENT PROFILE ADULT. - CAREGIVER
32 y/o female with T2DM and asthma s/p repeat c/s, postoperative course complicated by low UOP.    - Hemodynamically stable. VSS.  - Continue to monitor VS and I/Os.  - Carb consistent diet  - Low UOP: s/p 2L bolus.  UOP currently adequate.  - Repeat Hct 30.5 (31.7)  - Encourage ambulation and incentive spirometry  - Mylicon for gas  - Transfer to floor    Aashish, PGY3 No

## 2017-12-17 NOTE — H&P ADULT - HISTORY OF PRESENT ILLNESS
77 year old male with a history of rectal cancer s/p neoadjuvant chemoradiation in 2015 followed by low anterior resection in 2016 and subsequent ileostomy closure in October of 2016 who presents with a 24 history of abdominal pain. Pain is colicky in nature and worse in right lower quadrant. He had one episode of emesis in ER. Had liquid bowel movement and flatus this evening. His symptoms are similar to his recent bout of bowel obstruction for which he was discharged from the hospital only 5 days ago.

## 2017-12-17 NOTE — ED STATDOCS - NS_ ATTENDINGSCRIBEDETAILS _ED_A_ED_FT
I, Farhad Torres DO, performed the initial face to face bedside interview with this patient regarding history of present illness and determined that the patient should be seen in the main ED.  The history, was documented by the scribe in my presence and I attest to the accuracy of the documentation.

## 2017-12-17 NOTE — ED ADULT TRIAGE NOTE - CHIEF COMPLAINT QUOTE
Pt presents to ED stating "I think I have a colon blockage, I was just released from here for it and am having the same s/s". Pt reports hx of colon ca. Pt reports "I only pass stool with gas and it comes shooting out". Pt denies vomiting.

## 2017-12-17 NOTE — H&P ADULT - NSHPPHYSICALEXAM_GEN_ALL_CORE
Vital Signs Last 24 Hrs  T(C): 36.8 (17 Dec 2017 17:11), Max: 36.8 (17 Dec 2017 17:11)  T(F): 98.2 (17 Dec 2017 17:11), Max: 98.2 (17 Dec 2017 17:11)  HR: 59 (17 Dec 2017 19:11) (59 - 67)  BP: 142/79 (17 Dec 2017 19:11) (139/82 - 142/79)  RR: 17 (17 Dec 2017 19:11) (17 - 18)  SpO2: 100% (17 Dec 2017 19:11) (99% - 100%)    General: in no distress  HEENT: normocephalic, atraumatic  Respiratory: non labored breathing  Cardiovascular: regular rate and rhythm  Abdomen: soft, distended, some tenderness in the RLQ, no peritonitis  Extremities: moves extremities without difficulty  Skin: warm and dry  Neuro: alert and oriented x 3

## 2017-12-17 NOTE — ED ADULT NURSE NOTE - CHPI ED SYMPTOMS NEG
no vomiting/no diarrhea/no hematuria/no fever/no chills/no burning urination/no blood in stool/no dysuria

## 2017-12-17 NOTE — H&P ADULT - ASSESSMENT
77 year old male with recurrent small bowel obstruction. Plan for non operative management with bowel rest, NG decompression and IV fluids. Prn pain medications and antiemetics. May need surgical intervention if no improvement or recurrent issue especially since this is his second bowel obstruction in a week.

## 2017-12-17 NOTE — ED STATDOCS - PROGRESS NOTE DETAILS
76 y/o M with Hx of colon CA (treated with radiation, resected) presents to ED for evaluation of abd pain. Onset of Sx was 1 day ago at night. No n/v. Pt reports his stool was thin and twisted which is not nml for him. +Hx of bowel obstruction and pt states this is similar. Pt contacted Dr. Rioseria his surgeon which prompted him to seek evaluation. Will send pt to the main ED for further evaluation. 76 y/o M with Hx of colon CA (treated with radiation, resected), HTN, HLD presents to ED for evaluation of abd pain. Onset of Sx was 1 day ago at night. No n/v. Pt reports his stool was thin and twisted which is not nml for him. +Hx of bowel obstruction and pt states this is similar. Pt contacted Dr. Rioseria his surgeon which prompted him to seek evaluation. Will send pt to the main ED for further evaluation. Farhad Torres DO (Attending): I, Farhad Torres DO, performed the initial face to face bedside interview with this patient regarding history of present illness and determined that the patient should be seen in the main ED.  The history, was documented by the scribe in my presence and I attest to the accuracy of the documentation.

## 2017-12-18 DIAGNOSIS — Z87.891 PERSONAL HISTORY OF NICOTINE DEPENDENCE: ICD-10-CM

## 2017-12-18 DIAGNOSIS — I71.4 ABDOMINAL AORTIC ANEURYSM, WITHOUT RUPTURE: ICD-10-CM

## 2017-12-18 DIAGNOSIS — Z85.038 PERSONAL HISTORY OF OTHER MALIGNANT NEOPLASM OF LARGE INTESTINE: ICD-10-CM

## 2017-12-18 DIAGNOSIS — I10 ESSENTIAL (PRIMARY) HYPERTENSION: ICD-10-CM
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
## 2017-12-18 DIAGNOSIS — Z88.8 ALLERGY STATUS TO OTHER DRUGS, MEDICAMENTS AND BIOLOGICAL SUBSTANCES STATUS: ICD-10-CM

## 2017-12-18 DIAGNOSIS — K56.609 UNSPECIFIED INTESTINAL OBSTRUCTION, UNSPECIFIED AS TO PARTIAL VERSUS COMPLETE OBSTRUCTION: ICD-10-CM

## 2017-12-18 DIAGNOSIS — T85.698A OTHER MECHANICAL COMPLICATION OF OTHER SPECIFIED INTERNAL PROSTHETIC DEVICES, IMPLANTS AND GRAFTS, INITIAL ENCOUNTER: ICD-10-CM

## 2017-12-18 PROBLEM — C18.9 MALIGNANT NEOPLASM OF COLON, UNSPECIFIED: Chronic | Status: ACTIVE | Noted: 2017-12-06

## 2017-12-18 PROBLEM — E78.5 HYPERLIPIDEMIA, UNSPECIFIED: Chronic | Status: ACTIVE | Noted: 2017-12-06

## 2017-12-18 LAB
ANION GAP SERPL CALC-SCNC: 7 MMOL/L — SIGNIFICANT CHANGE UP (ref 5–17)
BUN SERPL-MCNC: 30 MG/DL — HIGH (ref 7–23)
CALCIUM SERPL-MCNC: 8.8 MG/DL — SIGNIFICANT CHANGE UP (ref 8.5–10.1)
CHLORIDE SERPL-SCNC: 103 MMOL/L — SIGNIFICANT CHANGE UP (ref 96–108)
CO2 SERPL-SCNC: 31 MMOL/L — SIGNIFICANT CHANGE UP (ref 22–31)
CREAT SERPL-MCNC: 1.13 MG/DL — SIGNIFICANT CHANGE UP (ref 0.5–1.3)
GLUCOSE SERPL-MCNC: 121 MG/DL — HIGH (ref 70–99)
HCT VFR BLD CALC: 40.1 % — SIGNIFICANT CHANGE UP (ref 39–50)
HGB BLD-MCNC: 13.2 G/DL — SIGNIFICANT CHANGE UP (ref 13–17)
MAGNESIUM SERPL-MCNC: 2.3 MG/DL — SIGNIFICANT CHANGE UP (ref 1.6–2.6)
MCHC RBC-ENTMCNC: 31 PG — SIGNIFICANT CHANGE UP (ref 27–34)
MCHC RBC-ENTMCNC: 32.9 GM/DL — SIGNIFICANT CHANGE UP (ref 32–36)
MCV RBC AUTO: 94.3 FL — SIGNIFICANT CHANGE UP (ref 80–100)
PHOSPHATE SERPL-MCNC: 3.8 MG/DL — SIGNIFICANT CHANGE UP (ref 2.5–4.5)
PLATELET # BLD AUTO: 176 K/UL — SIGNIFICANT CHANGE UP (ref 150–400)
POTASSIUM SERPL-MCNC: 4.1 MMOL/L — SIGNIFICANT CHANGE UP (ref 3.5–5.3)
POTASSIUM SERPL-SCNC: 4.1 MMOL/L — SIGNIFICANT CHANGE UP (ref 3.5–5.3)
RBC # BLD: 4.25 M/UL — SIGNIFICANT CHANGE UP (ref 4.2–5.8)
RBC # FLD: 12.2 % — SIGNIFICANT CHANGE UP (ref 10.3–14.5)
SODIUM SERPL-SCNC: 141 MMOL/L — SIGNIFICANT CHANGE UP (ref 135–145)
WBC # BLD: 8.7 K/UL — SIGNIFICANT CHANGE UP (ref 3.8–10.5)
WBC # FLD AUTO: 8.7 K/UL — SIGNIFICANT CHANGE UP (ref 3.8–10.5)

## 2017-12-18 PROCEDURE — 99233 SBSQ HOSP IP/OBS HIGH 50: CPT

## 2017-12-18 PROCEDURE — 74022 RADEX COMPL AQT ABD SERIES: CPT | Mod: 26

## 2017-12-18 RX ORDER — MORPHINE SULFATE 50 MG/1
4 CAPSULE, EXTENDED RELEASE ORAL ONCE
Qty: 0 | Refills: 0 | Status: DISCONTINUED | OUTPATIENT
Start: 2017-12-18 | End: 2017-12-18

## 2017-12-18 RX ADMIN — ATENOLOL 50 MILLIGRAM(S): 25 TABLET ORAL at 21:06

## 2017-12-18 RX ADMIN — MORPHINE SULFATE 4 MILLIGRAM(S): 50 CAPSULE, EXTENDED RELEASE ORAL at 02:45

## 2017-12-18 RX ADMIN — MORPHINE SULFATE 2 MILLIGRAM(S): 50 CAPSULE, EXTENDED RELEASE ORAL at 05:47

## 2017-12-18 RX ADMIN — HEPARIN SODIUM 5000 UNIT(S): 5000 INJECTION INTRAVENOUS; SUBCUTANEOUS at 06:01

## 2017-12-18 RX ADMIN — MORPHINE SULFATE 2 MILLIGRAM(S): 50 CAPSULE, EXTENDED RELEASE ORAL at 18:27

## 2017-12-18 RX ADMIN — MORPHINE SULFATE 2 MILLIGRAM(S): 50 CAPSULE, EXTENDED RELEASE ORAL at 11:33

## 2017-12-18 RX ADMIN — MORPHINE SULFATE 4 MILLIGRAM(S): 50 CAPSULE, EXTENDED RELEASE ORAL at 03:00

## 2017-12-18 RX ADMIN — SODIUM CHLORIDE 100 MILLILITER(S): 9 INJECTION, SOLUTION INTRAVENOUS at 17:14

## 2017-12-18 RX ADMIN — SODIUM CHLORIDE 100 MILLILITER(S): 9 INJECTION, SOLUTION INTRAVENOUS at 06:03

## 2017-12-18 RX ADMIN — HEPARIN SODIUM 5000 UNIT(S): 5000 INJECTION INTRAVENOUS; SUBCUTANEOUS at 14:34

## 2017-12-18 RX ADMIN — MORPHINE SULFATE 2 MILLIGRAM(S): 50 CAPSULE, EXTENDED RELEASE ORAL at 11:45

## 2017-12-18 RX ADMIN — HEPARIN SODIUM 5000 UNIT(S): 5000 INJECTION INTRAVENOUS; SUBCUTANEOUS at 21:06

## 2017-12-18 RX ADMIN — ONDANSETRON 4 MILLIGRAM(S): 8 TABLET, FILM COATED ORAL at 18:27

## 2017-12-18 RX ADMIN — ONDANSETRON 4 MILLIGRAM(S): 8 TABLET, FILM COATED ORAL at 08:37

## 2017-12-18 NOTE — CONSULT NOTE ADULT - ASSESSMENT
78 yo male with pmh rectal CA sp aurea-adjuvant chemo now in remission, LAR with ileostomy s/p reversal in 2015, s/p Bilateral 2012/2015, HTN p/w recurrent SBO    #SBO  NGT to suction, NPO, IVF, electrolyte correction, ambulation, serial abd exams and continued care per primary team.   If patient to proceed to OR for ex-lap/VICTORIA, patient stands at low risk for mod risk procedure and stands at no absolute CI for procedure at hand.     #HTN  Recc: continue norvasc    #CEA    #Hxof Rectal CA s/p chemo with ileostomy  In remission  Outpt follow up with Dr Inman    DVT px

## 2017-12-18 NOTE — PROGRESS NOTE ADULT - SUBJECTIVE AND OBJECTIVE BOX
Still having intermittent abdominal pain and requiring prn morphine. Had emesis around NG tube and feels better.     Exam:  Vital Signs Last 24 Hrs  T(C): 37.1 (18 Dec 2017 05:00), Max: 37.1 (18 Dec 2017 05:00)  T(F): 98.7 (18 Dec 2017 05:00), Max: 98.7 (18 Dec 2017 05:00)  HR: 88 (18 Dec 2017 05:00) (58 - 88)  BP: 128/71 (18 Dec 2017 05:00) (128/71 - 150/69)  RR: 18 (18 Dec 2017 05:00) (17 - 18)  SpO2: 93% (18 Dec 2017 05:00) (93% - 100%)    In no distress  Non labored breathing  Abdomen soft, mild distension, non tender at the time of exam  Alert and oriented x 3                          13.2   8.7   )-----------( 176      ( 18 Dec 2017 06:37 )             40.1   12-18    141  |  103  |  30<H>  ----------------------------<  121<H>  4.1   |  31  |  1.13    Ca    8.8      18 Dec 2017 06:37  Phos  3.8     12-18  Mg     2.3     12-18    TPro  7.7  /  Alb  3.6  /  TBili  0.4  /  DBili  x   /  AST  55<H>  /  ALT  52  /  AlkPhos  142<H>  12-17

## 2017-12-18 NOTE — PROGRESS NOTE ADULT - ASSESSMENT
77 year old male with recurrent small bowel obstruction. Continue bowel rest with NG tube. Abdominal x ray today. Will likely need OR intervention this admission. Will get medical clearance.

## 2017-12-18 NOTE — PROVIDER CONTACT NOTE (OTHER) - SITUATION
pt admitted with small bowel obstruction. pt complaints of pain as per order can not receive morphine until 0300.

## 2017-12-18 NOTE — CONSULT NOTE ADULT - SUBJECTIVE AND OBJECTIVE BOX
PCP: Jessy Gordon    76 yo male with pmh rectal CA sp aurea-adjuvant chemo now in remission, LAR with ileostomy s/p reversal in 2015, s/p Bilateral 2012/2015, HTN recently dc for sbo approx 1 week ago now p/w abd pain found to have SBO. Pt having vomiting around NGT with no flatus or BM> Has abd distention. NG tube changed in my presence and put out 400cc immediately with significant degree of pain and nasuea and vomiting resovled.           Social: former smoker  Shx: LAR s/p ileostomy with reversal, CEA  Fhx: no hx of 1st degree relatives with CA    ICU Vital Signs Last 24 Hrs  T(C): 36.6 (08 Dec 2017 05:27), Max: 37 (08 Dec 2017 03:15)  T(F): 97.9 (08 Dec 2017 05:27), Max: 98.6 (08 Dec 2017 03:15)  HR: 58 (08 Dec 2017 05:27) (56 - 85)  BP: 152/76 (08 Dec 2017 05:27) (123/54 - 153/81)  BP(mean): --  ABP: --  ABP(mean): --  RR: 16 (08 Dec 2017 05:27) (16 - 17)  SpO2: 94% (08 Dec 2017 05:27) (91% - 96%)  ec 2017 13:18) (94% - 98%)  meds: reviewed        PHYSICAL EXAM:    Constitutional: NAD, awake and alert, well-developed  HEENT: PERR, EOMI, Normal Hearing, MMM, NGT draning yellow fluid-to suction  Neck: Soft and supple, No LAD, No JVD  Respiratory: Breath sounds are clear bilaterally, No wheezing, rales or rhonchi  Cardiovascular: S1 and S2, regular rate and rhythm, no Murmurs, gallops or rubs  Gastrointestinal: +BS, + distenstion  Extremities: No peripheral edema  Vascular: 2+ peripheral pulses  Neurological: A/O x 3, no focal deficits  Musculoskeletal: 5/5 strength b/l upper and lower extremities  Skin: No rashes        EKG: NSR, 61 bpm, QTc 414, good R wave progression with early repolarization  LABS: All Labs Reviewed:                        13.6   7.8   )-----------( 115      ( 07 Dec 2017 05:33 )             40.3     12-07    140  |  104  |  25<H>  ----------------------------<  100<H>  3.9   |  32<H>  |  1.07    Ca    8.7      07 Dec 2017 05:33  Mg     2.1     12-06    TPro  8.0  /  Alb  3.9  /  TBili  0.9  /  DBili  x   /  AST  24  /  ALT  32  /  AlkPhos  106  12-06    PT/INR - ( 07 Dec 2017 05:33 )   PT: 11.2 sec;   INR: 1.04 ratio         PTT - ( 07 Dec 2017 05:33 )  PTT:28.1 sec          Blood Culture:             < from: Xray Abdomen Flat & Upright Decub (12.07.17 @ 10:05) >  IMPRESSION:    Dilated small bowel loops suspicious for small bowel obstruction with   improvement on the December 07, 201    < end of copied text >

## 2017-12-19 LAB
ANION GAP SERPL CALC-SCNC: 7 MMOL/L — SIGNIFICANT CHANGE UP (ref 5–17)
BLD GP AB SCN SERPL QL: SIGNIFICANT CHANGE UP
BUN SERPL-MCNC: 29 MG/DL — HIGH (ref 7–23)
CALCIUM SERPL-MCNC: 8.4 MG/DL — LOW (ref 8.5–10.1)
CHLORIDE SERPL-SCNC: 104 MMOL/L — SIGNIFICANT CHANGE UP (ref 96–108)
CO2 SERPL-SCNC: 33 MMOL/L — HIGH (ref 22–31)
CREAT SERPL-MCNC: 1.14 MG/DL — SIGNIFICANT CHANGE UP (ref 0.5–1.3)
GLUCOSE SERPL-MCNC: 99 MG/DL — SIGNIFICANT CHANGE UP (ref 70–99)
HCT VFR BLD CALC: 38 % — LOW (ref 39–50)
HGB BLD-MCNC: 12.2 G/DL — LOW (ref 13–17)
MAGNESIUM SERPL-MCNC: 2.1 MG/DL — SIGNIFICANT CHANGE UP (ref 1.6–2.6)
MCHC RBC-ENTMCNC: 30.6 PG — SIGNIFICANT CHANGE UP (ref 27–34)
MCHC RBC-ENTMCNC: 32.1 GM/DL — SIGNIFICANT CHANGE UP (ref 32–36)
MCV RBC AUTO: 95.3 FL — SIGNIFICANT CHANGE UP (ref 80–100)
PHOSPHATE SERPL-MCNC: 2.8 MG/DL — SIGNIFICANT CHANGE UP (ref 2.5–4.5)
PLATELET # BLD AUTO: 148 K/UL — LOW (ref 150–400)
POTASSIUM SERPL-MCNC: 3.6 MMOL/L — SIGNIFICANT CHANGE UP (ref 3.5–5.3)
POTASSIUM SERPL-SCNC: 3.6 MMOL/L — SIGNIFICANT CHANGE UP (ref 3.5–5.3)
RBC # BLD: 3.99 M/UL — LOW (ref 4.2–5.8)
RBC # FLD: 12.2 % — SIGNIFICANT CHANGE UP (ref 10.3–14.5)
SODIUM SERPL-SCNC: 144 MMOL/L — SIGNIFICANT CHANGE UP (ref 135–145)
TYPE + AB SCN PNL BLD: SIGNIFICANT CHANGE UP
WBC # BLD: 5.2 K/UL — SIGNIFICANT CHANGE UP (ref 3.8–10.5)
WBC # FLD AUTO: 5.2 K/UL — SIGNIFICANT CHANGE UP (ref 3.8–10.5)

## 2017-12-19 PROCEDURE — 74183 MRI ABD W/O CNTR FLWD CNTR: CPT | Mod: 26

## 2017-12-19 PROCEDURE — 72197 MRI PELVIS W/O & W/DYE: CPT | Mod: 26

## 2017-12-19 RX ORDER — POTASSIUM CHLORIDE 20 MEQ
40 PACKET (EA) ORAL EVERY 4 HOURS
Qty: 0 | Refills: 0 | Status: COMPLETED | OUTPATIENT
Start: 2017-12-19 | End: 2017-12-19

## 2017-12-19 RX ORDER — SODIUM CHLORIDE 9 MG/ML
1000 INJECTION INTRAMUSCULAR; INTRAVENOUS; SUBCUTANEOUS ONCE
Qty: 0 | Refills: 0 | Status: COMPLETED | OUTPATIENT
Start: 2017-12-19 | End: 2017-12-19

## 2017-12-19 RX ADMIN — HEPARIN SODIUM 5000 UNIT(S): 5000 INJECTION INTRAVENOUS; SUBCUTANEOUS at 21:05

## 2017-12-19 RX ADMIN — ATENOLOL 50 MILLIGRAM(S): 25 TABLET ORAL at 21:04

## 2017-12-19 RX ADMIN — HEPARIN SODIUM 5000 UNIT(S): 5000 INJECTION INTRAVENOUS; SUBCUTANEOUS at 14:41

## 2017-12-19 RX ADMIN — Medication 40 MILLIEQUIVALENT(S): at 10:55

## 2017-12-19 RX ADMIN — HEPARIN SODIUM 5000 UNIT(S): 5000 INJECTION INTRAVENOUS; SUBCUTANEOUS at 05:58

## 2017-12-19 RX ADMIN — SODIUM CHLORIDE 500 MILLILITER(S): 9 INJECTION INTRAMUSCULAR; INTRAVENOUS; SUBCUTANEOUS at 10:55

## 2017-12-19 RX ADMIN — AMLODIPINE BESYLATE 5 MILLIGRAM(S): 2.5 TABLET ORAL at 05:58

## 2017-12-19 RX ADMIN — Medication 40 MILLIEQUIVALENT(S): at 14:41

## 2017-12-19 RX ADMIN — SODIUM CHLORIDE 100 MILLILITER(S): 9 INJECTION, SOLUTION INTRAVENOUS at 02:40

## 2017-12-19 NOTE — CONSULT NOTE ADULT - ASSESSMENT
76 yo male with pmh rectal CA sp aurea-adjuvant chemo now in remission, LAR with ileostomy s/p reversal in 2015, s/p Bilateral 2012/2015, HTN p/w recurrent SBO    #SBO  NGT to suction, NPO, IVF, electrolyte correction, ambulation, serial abd exams and continued care per primary team. plan for MRI enterography for etiology of recurrent SBO  If patient to proceed to OR for ex-lap/VICTORIA, patient stands at low risk for mod risk procedure and stands at no absolute CI for procedure at hand.     #HTN  Recc: continue norvasc    #CEA    #Hxof Rectal CA s/p chemo with ileostomy  In remission  Outpt follow up with Dr Inman    DVT px

## 2017-12-19 NOTE — PROGRESS NOTE ADULT - ASSESSMENT
A/P - Recurrent pSBO    Cont NPO, NGT decompression.  Will d/w Dr. Razo re: surgical timing. A/P - Recurrent pSBO    Cont NPO, NGT decompression.  MRI enterography ordered to r/o recurrent cancer as cause for recurrent SBO.  Add on tomorrow OR schedule for ex-lap, VICTORIA, possible SBR.

## 2017-12-19 NOTE — PROGRESS NOTE ADULT - SUBJECTIVE AND OBJECTIVE BOX
NGT repositioned yesterday with resultant 2750 cc out. Had multiple BMs overnight with relief. Feels improved with less distention and abdominal discomfort.    MEDICATIONS  (STANDING):  amLODIPine   Tablet 5 milliGRAM(s) Oral daily  ATENolol  Tablet 50 milliGRAM(s) Oral at bedtime  heparin  Injectable 5000 Unit(s) SubCutaneous every 8 hours  lactated ringers. 1000 milliLiter(s) (100 mL/Hr) IV Continuous <Continuous>    MEDICATIONS  (PRN):  morphine  - Injectable 2 milliGRAM(s) IV Push every 4 hours PRN Severe Pain (7 - 10)  ondansetron Injectable 4 milliGRAM(s) IV Push every 6 hours PRN Nausea    Vital Signs Last 24 Hrs  T(C): 37 (19 Dec 2017 04:40), Max: 37 (19 Dec 2017 04:40)  T(F): 98.6 (19 Dec 2017 04:40), Max: 98.6 (19 Dec 2017 04:40)  HR: 92 (19 Dec 2017 04:40) (68 - 92)  BP: 119/58 (19 Dec 2017 04:40) (111/64 - 129/73)  BP(mean): --  RR: 18 (19 Dec 2017 04:40) (18 - 18)  SpO2: 95% (19 Dec 2017 04:40) (94% - 95%)  I&O's Detail    18 Dec 2017 07:01  -  19 Dec 2017 07:00  --------------------------------------------------------  IN:    lactated ringers.: 2690 mL  Total IN: 2690 mL    OUT:    Drain: 2750 mL    Voided: 725 mL  Total OUT: 3475 mL    Total NET: -785 mL    NAD  ABD exam :soft, NT, mild distention                        12.2   5.2   )-----------( 148      ( 19 Dec 2017 05:43 )             38.0     12-19    144  |  104  |  29<H>  ----------------------------<  99  3.6   |  33<H>  |  1.14    Ca    8.4<L>      19 Dec 2017 05:43  Phos  2.8     12-19  Mg     2.1     12-19    TPro  7.7  /  Alb  3.6  /  TBili  0.4  /  DBili  x   /  AST  55<H>  /  ALT  52  /  AlkPhos  142<H>  12-17

## 2017-12-19 NOTE — CONSULT NOTE ADULT - SUBJECTIVE AND OBJECTIVE BOX
PCP: Jessy Gordon    78 yo male with pmh rectal CA sp aurea-adjuvant chemo now in remission, LAR with ileostomy s/p reversal in 2015, s/p Bilateral 2012/2015, HTN recently dc for sbo approx 1 week ago now p/w abd pain found to have SBO. Pt having vomiting around NGT with no flatus or BM> Has abd distention. NG tube changed in my presence and put out 400cc immediately with significant degree of pain and nasuea and vomiting resovled. Total 2750 in 24 hrs    12/19: abd less distended 2750 in 24 hrs, + fltus/BM          Social: former smoker  Shx: LAR s/p ileostomy with reversal, CEA  Fhx: no hx of 1st degree relatives with CA    ICU Vital Signs Last 24 Hrs  T(C): 36.8 (19 Dec 2017 12:10), Max: 37 (19 Dec 2017 04:40)  T(F): 98.2 (19 Dec 2017 12:10), Max: 98.6 (19 Dec 2017 04:40)  HR: 92 (19 Dec 2017 12:10) (68 - 92)  BP: 112/65 (19 Dec 2017 12:10) (112/65 - 129/73)  BP(mean): 77 (19 Dec 2017 12:10) (77 - 77)  ABP: --  ABP(mean): --  RR: 19 (19 Dec 2017 12:10) (18 - 19)  SpO2: 92% (19 Dec 2017 12:10) (92% - 95%)          PHYSICAL EXAM:    Constitutional: NAD, awake and alert, well-developed  HEENT: PERR, EOMI, Normal Hearing, MMM, NGT draning yellow fluid-to suction  Neck: Soft and supple, No LAD, No JVD  Respiratory: Breath sounds are clear bilaterally, No wheezing, rales or rhonchi  Cardiovascular: S1 and S2, regular rate and rhythm, no Murmurs, gallops or rubs  Gastrointestinal: +BS, minimal distention, no guarding or rebounding  Extremities: No peripheral edema  Vascular: 2+ peripheral pulses  Neurological: A/O x 3, no focal deficits  Musculoskeletal: 5/5 strength b/l upper and lower extremities  Skin: No rashes        EKG: NSR, 61 bpm, QTc 414, good R wave progression with early repolarization  LABS: All Labs Reviewed:                        13.6   7.8   )-----------( 115      ( 07 Dec 2017 05:33 )             40.3     12-07    140  |  104  |  25<H>  ----------------------------<  100<H>  3.9   |  32<H>  |  1.07    Ca    8.7      07 Dec 2017 05:33  Mg     2.1     12-06    TPro  8.0  /  Alb  3.9  /  TBili  0.9  /  DBili  x   /  AST  24  /  ALT  32  /  AlkPhos  106  12-06    PT/INR - ( 07 Dec 2017 05:33 )   PT: 11.2 sec;   INR: 1.04 ratio         PTT - ( 07 Dec 2017 05:33 )  PTT:28.1 sec          Blood Culture:             < from: Xray Abdomen Flat & Upright Decub (12.07.17 @ 10:05) >  IMPRESSION:    Dilated small bowel loops suspicious for small bowel obstruction with   improvement on the December 07, 201    < end of copied text >

## 2017-12-20 ENCOUNTER — RESULT REVIEW (OUTPATIENT)
Age: 77
End: 2017-12-20

## 2017-12-20 ENCOUNTER — APPOINTMENT (OUTPATIENT)
Dept: COLORECTAL SURGERY | Facility: HOSPITAL | Age: 77
End: 2017-12-20
Payer: MEDICARE

## 2017-12-20 LAB
ANION GAP SERPL CALC-SCNC: 9 MMOL/L — SIGNIFICANT CHANGE UP (ref 5–17)
BUN SERPL-MCNC: 18 MG/DL — SIGNIFICANT CHANGE UP (ref 7–23)
CALCIUM SERPL-MCNC: 8.5 MG/DL — SIGNIFICANT CHANGE UP (ref 8.5–10.1)
CHLORIDE SERPL-SCNC: 102 MMOL/L — SIGNIFICANT CHANGE UP (ref 96–108)
CO2 SERPL-SCNC: 27 MMOL/L — SIGNIFICANT CHANGE UP (ref 22–31)
CREAT SERPL-MCNC: 0.9 MG/DL — SIGNIFICANT CHANGE UP (ref 0.5–1.3)
GLUCOSE BLDC GLUCOMTR-MCNC: 70 MG/DL — SIGNIFICANT CHANGE UP (ref 70–99)
GLUCOSE SERPL-MCNC: 70 MG/DL — SIGNIFICANT CHANGE UP (ref 70–99)
HBA1C BLD-MCNC: 5.6 % — SIGNIFICANT CHANGE UP (ref 4–5.6)
MAGNESIUM SERPL-MCNC: 1.9 MG/DL — SIGNIFICANT CHANGE UP (ref 1.6–2.6)
PHOSPHATE SERPL-MCNC: 1.9 MG/DL — LOW (ref 2.5–4.5)
POTASSIUM SERPL-MCNC: 4.3 MMOL/L — SIGNIFICANT CHANGE UP (ref 3.5–5.3)
POTASSIUM SERPL-SCNC: 4.3 MMOL/L — SIGNIFICANT CHANGE UP (ref 3.5–5.3)
SODIUM SERPL-SCNC: 138 MMOL/L — SIGNIFICANT CHANGE UP (ref 135–145)

## 2017-12-20 PROCEDURE — 44050 REDUCE BOWEL OBSTRUCTION: CPT | Mod: AS

## 2017-12-20 PROCEDURE — 88305 TISSUE EXAM BY PATHOLOGIST: CPT | Mod: 26

## 2017-12-20 PROCEDURE — 49203: CPT | Mod: AS

## 2017-12-20 PROCEDURE — 44050 REDUCE BOWEL OBSTRUCTION: CPT

## 2017-12-20 PROCEDURE — 49203: CPT

## 2017-12-20 RX ORDER — NALOXONE HYDROCHLORIDE 4 MG/.1ML
0.1 SPRAY NASAL
Qty: 0 | Refills: 0 | Status: DISCONTINUED | OUTPATIENT
Start: 2017-12-20 | End: 2017-12-25

## 2017-12-20 RX ORDER — PANTOPRAZOLE SODIUM 20 MG/1
40 TABLET, DELAYED RELEASE ORAL ONCE
Qty: 0 | Refills: 0 | Status: COMPLETED | OUTPATIENT
Start: 2017-12-20 | End: 2017-12-20

## 2017-12-20 RX ORDER — SODIUM CHLORIDE 9 MG/ML
1000 INJECTION INTRAMUSCULAR; INTRAVENOUS; SUBCUTANEOUS
Qty: 0 | Refills: 0 | Status: DISCONTINUED | OUTPATIENT
Start: 2017-12-20 | End: 2017-12-21

## 2017-12-20 RX ORDER — SODIUM CHLORIDE 9 MG/ML
1000 INJECTION, SOLUTION INTRAVENOUS
Qty: 0 | Refills: 0 | Status: DISCONTINUED | OUTPATIENT
Start: 2017-12-20 | End: 2017-12-21

## 2017-12-20 RX ORDER — ONDANSETRON 8 MG/1
4 TABLET, FILM COATED ORAL ONCE
Qty: 0 | Refills: 0 | Status: DISCONTINUED | OUTPATIENT
Start: 2017-12-20 | End: 2017-12-21

## 2017-12-20 RX ORDER — HYDROMORPHONE HYDROCHLORIDE 2 MG/ML
0.5 INJECTION INTRAMUSCULAR; INTRAVENOUS; SUBCUTANEOUS
Qty: 0 | Refills: 0 | Status: DISCONTINUED | OUTPATIENT
Start: 2017-12-20 | End: 2017-12-21

## 2017-12-20 RX ORDER — ACETAMINOPHEN 500 MG
1000 TABLET ORAL ONCE
Qty: 0 | Refills: 0 | Status: COMPLETED | OUTPATIENT
Start: 2017-12-21 | End: 2017-12-21

## 2017-12-20 RX ORDER — HEPARIN SODIUM 5000 [USP'U]/ML
5000 INJECTION INTRAVENOUS; SUBCUTANEOUS EVERY 8 HOURS
Qty: 0 | Refills: 0 | Status: DISCONTINUED | OUTPATIENT
Start: 2017-12-20 | End: 2017-12-22

## 2017-12-20 RX ORDER — HYDROMORPHONE HYDROCHLORIDE 2 MG/ML
30 INJECTION INTRAMUSCULAR; INTRAVENOUS; SUBCUTANEOUS
Qty: 0 | Refills: 0 | Status: DISCONTINUED | OUTPATIENT
Start: 2017-12-20 | End: 2017-12-23

## 2017-12-20 RX ORDER — CEFOTETAN DISODIUM 1 G
2 VIAL (EA) INJECTION ONCE
Qty: 0 | Refills: 0 | Status: COMPLETED | OUTPATIENT
Start: 2017-12-21 | End: 2017-12-21

## 2017-12-20 RX ORDER — PROCHLORPERAZINE MALEATE 5 MG
10 TABLET ORAL EVERY 6 HOURS
Qty: 0 | Refills: 0 | Status: DISCONTINUED | OUTPATIENT
Start: 2017-12-20 | End: 2017-12-25

## 2017-12-20 RX ORDER — ONDANSETRON 8 MG/1
4 TABLET, FILM COATED ORAL EVERY 6 HOURS
Qty: 0 | Refills: 0 | Status: DISCONTINUED | OUTPATIENT
Start: 2017-12-20 | End: 2017-12-25

## 2017-12-20 RX ORDER — DIPHENHYDRAMINE HCL 50 MG
25 CAPSULE ORAL EVERY 4 HOURS
Qty: 0 | Refills: 0 | Status: DISCONTINUED | OUTPATIENT
Start: 2017-12-20 | End: 2017-12-25

## 2017-12-20 RX ORDER — HYDROMORPHONE HYDROCHLORIDE 2 MG/ML
0.5 INJECTION INTRAMUSCULAR; INTRAVENOUS; SUBCUTANEOUS
Qty: 0 | Refills: 0 | Status: DISCONTINUED | OUTPATIENT
Start: 2017-12-20 | End: 2017-12-23

## 2017-12-20 RX ORDER — ATENOLOL 25 MG/1
50 TABLET ORAL AT BEDTIME
Qty: 0 | Refills: 0 | Status: DISCONTINUED | OUTPATIENT
Start: 2017-12-20 | End: 2017-12-25

## 2017-12-20 RX ORDER — PANTOPRAZOLE SODIUM 20 MG/1
40 TABLET, DELAYED RELEASE ORAL DAILY
Qty: 0 | Refills: 0 | Status: DISCONTINUED | OUTPATIENT
Start: 2017-12-20 | End: 2017-12-25

## 2017-12-20 RX ORDER — AMLODIPINE BESYLATE 2.5 MG/1
5 TABLET ORAL DAILY
Qty: 0 | Refills: 0 | Status: DISCONTINUED | OUTPATIENT
Start: 2017-12-20 | End: 2017-12-25

## 2017-12-20 RX ORDER — MEPERIDINE HYDROCHLORIDE 50 MG/ML
12.5 INJECTION INTRAMUSCULAR; INTRAVENOUS; SUBCUTANEOUS
Qty: 0 | Refills: 0 | Status: DISCONTINUED | OUTPATIENT
Start: 2017-12-20 | End: 2017-12-21

## 2017-12-20 RX ADMIN — SODIUM CHLORIDE 100 MILLILITER(S): 9 INJECTION, SOLUTION INTRAVENOUS at 18:16

## 2017-12-20 RX ADMIN — ONDANSETRON 4 MILLIGRAM(S): 8 TABLET, FILM COATED ORAL at 18:42

## 2017-12-20 RX ADMIN — HEPARIN SODIUM 5000 UNIT(S): 5000 INJECTION INTRAVENOUS; SUBCUTANEOUS at 05:08

## 2017-12-20 RX ADMIN — AMLODIPINE BESYLATE 5 MILLIGRAM(S): 2.5 TABLET ORAL at 05:07

## 2017-12-20 RX ADMIN — HEPARIN SODIUM 5000 UNIT(S): 5000 INJECTION INTRAVENOUS; SUBCUTANEOUS at 23:21

## 2017-12-20 RX ADMIN — HYDROMORPHONE HYDROCHLORIDE 30 MILLILITER(S): 2 INJECTION INTRAMUSCULAR; INTRAVENOUS; SUBCUTANEOUS at 23:03

## 2017-12-20 RX ADMIN — Medication 62.5 MILLIMOLE(S): at 12:51

## 2017-12-20 RX ADMIN — ONDANSETRON 4 MILLIGRAM(S): 8 TABLET, FILM COATED ORAL at 23:22

## 2017-12-20 RX ADMIN — PANTOPRAZOLE SODIUM 40 MILLIGRAM(S): 20 TABLET, DELAYED RELEASE ORAL at 00:52

## 2017-12-20 RX ADMIN — SODIUM CHLORIDE 100 MILLILITER(S): 9 INJECTION, SOLUTION INTRAVENOUS at 04:11

## 2017-12-20 NOTE — CONSULT NOTE ADULT - SUBJECTIVE AND OBJECTIVE BOX
PCP: Jessy Gordon    78 yo male with pmh rectal CA sp aurea-adjuvant chemo now in remission, LAR with ileostomy s/p reversal in 2015, s/p Bilateral 2012/2015, HTN recently dc for sbo approx 1 week ago now p/w abd pain found to have SBO. Pt having vomiting around NGT with no flatus or BM> Has abd distention. NG tube changed in my presence and put out 400cc immediately with significant degree of pain and nasuea and vomiting resovled. Total 2750 in 24 hrs    12/19: abd less distended 2750 in 24 hrs, + fltus/BM  12/20: 1L NGT output. MR enterography c/w adhesion, no masses or strictures          Social: former smoker  Shx: LAR s/p ileostomy with reversal, CEA  Fhx: no hx of 1st degree relatives with CA    ICU Vital Signs Last 24 Hrs  T(C): 36.8 (20 Dec 2017 05:05), Max: 37.1 (19 Dec 2017 20:14)  T(F): 98.2 (20 Dec 2017 05:05), Max: 98.8 (19 Dec 2017 20:14)  HR: 62 (20 Dec 2017 05:05) (62 - 95)  BP: 136/74 (20 Dec 2017 05:05) (133/69 - 136/74)  BP(mean): --  ABP: --  ABP(mean): --  RR: 16 (20 Dec 2017 05:05) (16 - 18)  SpO2: 96% (20 Dec 2017 05:05) (95% - 96%)          PHYSICAL EXAM:    Constitutional: NAD, awake and alert, well-developed  HEENT: PERR, EOMI, Normal Hearing, MMM, NGT draning yellow fluid-to suction  Neck: Soft and supple, No LAD, No JVD  Respiratory: Breath sounds are clear bilaterally, No wheezing, rales or rhonchi  Cardiovascular: S1 and S2, regular rate and rhythm, no Murmurs, gallops or rubs  Gastrointestinal: +BS, minimal distention, no guarding or rebounding  Extremities: No peripheral edema  Vascular: 2+ peripheral pulses  Neurological: A/O x 3, no focal deficits  Musculoskeletal: 5/5 strength b/l upper and lower extremities  Skin: No rashes        EKG: NSR, 61 bpm, QTc 414, good R wave progression with early repolarization  LABS: All Labs Reviewed:                        13.6   7.8   )-----------( 115      ( 07 Dec 2017 05:33 )             40.3     12-07    140  |  104  |  25<H>  ----------------------------<  100<H>  3.9   |  32<H>  |  1.07    Ca    8.7      07 Dec 2017 05:33  Mg     2.1     12-06    TPro  8.0  /  Alb  3.9  /  TBili  0.9  /  DBili  x   /  AST  24  /  ALT  32  /  AlkPhos  106  12-06    PT/INR - ( 07 Dec 2017 05:33 )   PT: 11.2 sec;   INR: 1.04 ratio         PTT - ( 07 Dec 2017 05:33 )  PTT:28.1 sec          Blood Culture:             < from: Xray Abdomen Flat & Upright Decub (12.07.17 @ 10:05) >  IMPRESSION:    Dilated small bowel loops suspicious for small bowel obstruction with   improvement on the December 07, 201    < end of copied text >

## 2017-12-20 NOTE — BRIEF OPERATIVE NOTE - PRE-OP
"Occupational Therapy Evaluation completed.   Functional Status:  Mod A x2 supine to sit.  Max A LB dressing, mod A UB dressing.  Pt washed face seated EOB with assist for balance.  Mod A x2 to return to supine.  Plan of Care: Will benefit from Occupational Therapy 2 times per week  Discharge Recommendations:  Equipment: No Equipment Needed. Post-acute therapy Discharge to home with outpatient or home health for additional skilled therapy services.    See \"Rehab Therapy-Acute\" Patient Summary Report for complete documentation.    " <<-----Click on this checkbox to enter Pre-Op Dx

## 2017-12-20 NOTE — BRIEF OPERATIVE NOTE - PROCEDURE
<<-----Click on this checkbox to enter Procedure Diagnostic laparoscopy with lysis of adhesions  12/20/2017    Active  AGODWIN

## 2017-12-20 NOTE — BRIEF OPERATIVE NOTE - OPERATION/FINDINGS
Patient underwent laparoscopic assisted lysis of adhesions. Upon entering the abdomen dense adhesions encountered that safely underwent adhesiolysis without any complications.

## 2017-12-20 NOTE — CONSULT NOTE ADULT - ASSESSMENT
78 yo male with pmh rectal CA sp aurea-adjuvant chemo now in remission, LAR with ileostomy s/p reversal in 2015, s/p Bilateral 2012/2015, HTN p/w recurrent SBO    #SBO  NGT to suction, NPO, IVF, electrolyte correction, ambulation, serial abd exams and continued care per primary team.  MRI enterography c/w adhesions  If patient to proceed to OR for ex-lap/VICTORIA, patient stands at low risk for mod risk procedure and stands at no absolute CI for procedure at hand.     #HTN  Recc: continue norvasc    #CEA    #Hxof Rectal CA s/p chemo with ileostomy  In remission  Outpt follow up with Dr Inman    DVT px

## 2017-12-21 LAB
ANION GAP SERPL CALC-SCNC: 11 MMOL/L — SIGNIFICANT CHANGE UP (ref 5–17)
BUN SERPL-MCNC: 18 MG/DL — SIGNIFICANT CHANGE UP (ref 7–23)
CALCIUM SERPL-MCNC: 8.2 MG/DL — LOW (ref 8.5–10.1)
CHLORIDE SERPL-SCNC: 104 MMOL/L — SIGNIFICANT CHANGE UP (ref 96–108)
CO2 SERPL-SCNC: 25 MMOL/L — SIGNIFICANT CHANGE UP (ref 22–31)
CREAT SERPL-MCNC: 1.08 MG/DL — SIGNIFICANT CHANGE UP (ref 0.5–1.3)
GLUCOSE BLDC GLUCOMTR-MCNC: 108 MG/DL — HIGH (ref 70–99)
GLUCOSE BLDC GLUCOMTR-MCNC: 111 MG/DL — HIGH (ref 70–99)
GLUCOSE BLDC GLUCOMTR-MCNC: 114 MG/DL — HIGH (ref 70–99)
GLUCOSE BLDC GLUCOMTR-MCNC: 114 MG/DL — HIGH (ref 70–99)
GLUCOSE BLDC GLUCOMTR-MCNC: 88 MG/DL — SIGNIFICANT CHANGE UP (ref 70–99)
GLUCOSE SERPL-MCNC: 107 MG/DL — HIGH (ref 70–99)
HCT VFR BLD CALC: 37.4 % — LOW (ref 39–50)
HGB BLD-MCNC: 12 G/DL — LOW (ref 13–17)
MAGNESIUM SERPL-MCNC: 1.6 MG/DL — SIGNIFICANT CHANGE UP (ref 1.6–2.6)
MCHC RBC-ENTMCNC: 30.6 PG — SIGNIFICANT CHANGE UP (ref 27–34)
MCHC RBC-ENTMCNC: 32.2 GM/DL — SIGNIFICANT CHANGE UP (ref 32–36)
MCV RBC AUTO: 95 FL — SIGNIFICANT CHANGE UP (ref 80–100)
PHOSPHATE SERPL-MCNC: 4.6 MG/DL — HIGH (ref 2.5–4.5)
PLATELET # BLD AUTO: 162 K/UL — SIGNIFICANT CHANGE UP (ref 150–400)
POTASSIUM SERPL-MCNC: 5.1 MMOL/L — SIGNIFICANT CHANGE UP (ref 3.5–5.3)
POTASSIUM SERPL-SCNC: 5.1 MMOL/L — SIGNIFICANT CHANGE UP (ref 3.5–5.3)
RBC # BLD: 3.94 M/UL — LOW (ref 4.2–5.8)
RBC # FLD: 12 % — SIGNIFICANT CHANGE UP (ref 10.3–14.5)
SODIUM SERPL-SCNC: 140 MMOL/L — SIGNIFICANT CHANGE UP (ref 135–145)
WBC # BLD: 8 K/UL — SIGNIFICANT CHANGE UP (ref 3.8–10.5)
WBC # FLD AUTO: 8 K/UL — SIGNIFICANT CHANGE UP (ref 3.8–10.5)

## 2017-12-21 RX ORDER — SODIUM CHLORIDE 9 MG/ML
1000 INJECTION INTRAMUSCULAR; INTRAVENOUS; SUBCUTANEOUS
Qty: 0 | Refills: 0 | Status: DISCONTINUED | OUTPATIENT
Start: 2017-12-21 | End: 2017-12-23

## 2017-12-21 RX ORDER — BENZOCAINE AND MENTHOL 5; 1 G/100ML; G/100ML
1 LIQUID ORAL
Qty: 0 | Refills: 0 | Status: DISCONTINUED | OUTPATIENT
Start: 2017-12-21 | End: 2017-12-21

## 2017-12-21 RX ORDER — BENZOCAINE AND MENTHOL 5; 1 G/100ML; G/100ML
1 LIQUID ORAL EVERY 6 HOURS
Qty: 0 | Refills: 0 | Status: DISCONTINUED | OUTPATIENT
Start: 2017-12-21 | End: 2017-12-25

## 2017-12-21 RX ADMIN — BENZOCAINE AND MENTHOL 1 LOZENGE: 5; 1 LIQUID ORAL at 13:32

## 2017-12-21 RX ADMIN — HEPARIN SODIUM 5000 UNIT(S): 5000 INJECTION INTRAVENOUS; SUBCUTANEOUS at 13:29

## 2017-12-21 RX ADMIN — SODIUM CHLORIDE 100 MILLILITER(S): 9 INJECTION, SOLUTION INTRAVENOUS at 00:07

## 2017-12-21 RX ADMIN — Medication 400 MILLIGRAM(S): at 05:50

## 2017-12-21 RX ADMIN — Medication 1000 MILLIGRAM(S): at 06:05

## 2017-12-21 RX ADMIN — HEPARIN SODIUM 5000 UNIT(S): 5000 INJECTION INTRAVENOUS; SUBCUTANEOUS at 05:51

## 2017-12-21 RX ADMIN — SODIUM CHLORIDE 125 MILLILITER(S): 9 INJECTION INTRAMUSCULAR; INTRAVENOUS; SUBCUTANEOUS at 15:52

## 2017-12-21 RX ADMIN — HEPARIN SODIUM 5000 UNIT(S): 5000 INJECTION INTRAVENOUS; SUBCUTANEOUS at 21:24

## 2017-12-21 RX ADMIN — ONDANSETRON 4 MILLIGRAM(S): 8 TABLET, FILM COATED ORAL at 09:15

## 2017-12-21 RX ADMIN — Medication 100 GRAM(S): at 09:15

## 2017-12-21 RX ADMIN — SODIUM CHLORIDE 100 MILLILITER(S): 9 INJECTION, SOLUTION INTRAVENOUS at 06:08

## 2017-12-21 RX ADMIN — ATENOLOL 50 MILLIGRAM(S): 25 TABLET ORAL at 21:24

## 2017-12-21 RX ADMIN — BENZOCAINE AND MENTHOL 1 LOZENGE: 5; 1 LIQUID ORAL at 23:31

## 2017-12-21 RX ADMIN — PANTOPRAZOLE SODIUM 40 MILLIGRAM(S): 20 TABLET, DELAYED RELEASE ORAL at 11:50

## 2017-12-21 RX ADMIN — AMLODIPINE BESYLATE 5 MILLIGRAM(S): 2.5 TABLET ORAL at 05:51

## 2017-12-21 RX ADMIN — SODIUM CHLORIDE 125 MILLILITER(S): 9 INJECTION INTRAMUSCULAR; INTRAVENOUS; SUBCUTANEOUS at 23:30

## 2017-12-21 NOTE — CONSULT NOTE ADULT - ASSESSMENT
76 yo male with pmh rectal CA sp aurea-adjuvant chemo now in remission, LAR with ileostomy s/p reversal in 2015, s/p Bilateral 2012/2015, HTN p/w recurrent SBO    #SBO  NGT to suction, NPO, IVF, electrolyte correction, ambulation, serial abd exams and continued care per primary team.  MRI enterography c/w adhesions  s/p VICTORIA POD #1  Care per primary team. Incentive spirometry. Pain control. Ambulation.    #HTN  Recc: continue norvasc    #CEA    #Hxof Rectal CA s/p chemo with ileostomy  In remission  Outpt follow up with Dr Inman    DVT px

## 2017-12-21 NOTE — PROGRESS NOTE ADULT - ASSESSMENT
Recurrent SBO  s/p Lap converted to open Lysis of adhesions-stable    dc eubanks  OOB. DVT prophylaxis  await more bowel function  cont with NGT.  IVF  IV protonix

## 2017-12-21 NOTE — CONSULT NOTE ADULT - SUBJECTIVE AND OBJECTIVE BOX
PCP: Jessy Gordon    76 yo male with pmh rectal CA sp aurea-adjuvant chemo now in remission, LAR with ileostomy s/p reversal in 2015, s/p Bilateral 2012/2015, HTN recently dc for sbo approx 1 week ago now p/w abd pain found to have SBO. Pt having vomiting around NGT with no flatus or BM> Has abd distention. NG tube changed in my presence and put out 400cc immediately with significant degree of pain and nasuea and vomiting resovled. Total 2750 in 24 hrs    12/19: abd less distended 2750 in 24 hrs, + fltus/BM  12/20: 1L NGT output. MR enterography c/w adhesion, no masses or strictures  12/21: s/p VICTORIA POD #1 no flatus/BM.           Social: former smoker  Shx: LAR s/p ileostomy with reversal, CEA  Fhx: no hx of 1st degree relatives with CA    ICU Vital Signs Last 24 Hrs  T(C): 36.3 (21 Dec 2017 12:17), Max: 37 (20 Dec 2017 22:43)  T(F): 97.3 (21 Dec 2017 12:17), Max: 98.6 (20 Dec 2017 22:43)  HR: 66 (21 Dec 2017 12:17) (62 - 81)  BP: 138/75 (21 Dec 2017 12:17) (118/59 - 141/67)  BP(mean): --  ABP: --  ABP(mean): --  RR: 18 (21 Dec 2017 12:17) (12 - 18)  SpO2: 95% (21 Dec 2017 12:17) (94% - 99%)            PHYSICAL EXAM:    Constitutional: NAD, awake and alert, well-developed  HEENT: PERR, EOMI, Normal Hearing, MMM, NGT draning yellow fluid-to suction  Neck: Soft and supple, No LAD, No JVD  Respiratory: Breath sounds are clear bilaterally, No wheezing, rales or rhonchi  Cardiovascular: S1 and S2, regular rate and rhythm, no Murmurs, gallops or rubs  Gastrointestinal: minimal distention, minimal BS  Extremities: No peripheral edema  Vascular: 2+ peripheral pulses  Neurological: A/O x 3, no focal deficits  Musculoskeletal: 5/5 strength b/l upper and lower extremities  Skin: No rashes        EKG: NSR, 61 bpm, QTc 414, good R wave progression with early repolarization  LABS: All Labs Reviewed:                        13.6   7.8   )-----------( 115      ( 07 Dec 2017 05:33 )             40.3     12-07    140  |  104  |  25<H>  ----------------------------<  100<H>  3.9   |  32<H>  |  1.07    Ca    8.7      07 Dec 2017 05:33  Mg     2.1     12-06    TPro  8.0  /  Alb  3.9  /  TBili  0.9  /  DBili  x   /  AST  24  /  ALT  32  /  AlkPhos  106  12-06    PT/INR - ( 07 Dec 2017 05:33 )   PT: 11.2 sec;   INR: 1.04 ratio         PTT - ( 07 Dec 2017 05:33 )  PTT:28.1 sec          Blood Culture:             < from: Xray Abdomen Flat & Upright Decub (12.07.17 @ 10:05) >  IMPRESSION:    Dilated small bowel loops suspicious for small bowel obstruction with   improvement on the December 07, 201    < end of copied text >

## 2017-12-21 NOTE — PROGRESS NOTE ADULT - SUBJECTIVE AND OBJECTIVE BOX
POD 1, c/o some heartburn, no n/v  pain controlled and minimal  no bowel function yet    Vital Signs Last 24 Hrs  T(C): 36.5 (21 Dec 2017 04:49), Max: 37 (20 Dec 2017 22:43)  T(F): 97.7 (21 Dec 2017 04:49), Max: 98.6 (20 Dec 2017 22:43)  HR: 72 (21 Dec 2017 04:49) (62 - 81)  BP: 118/59 (21 Dec 2017 04:49) (118/59 - 141/67)  BP(mean): --  RR: 18 (21 Dec 2017 04:49) (12 - 18)  SpO2: 97% (21 Dec 2017 04:49) (94% - 99%)    NAD  abd soft, incision clean, dressing changed                          12.0   8.0   )-----------( 162      ( 21 Dec 2017 05:18 )             37.4   12-21    140  |  104  |  18  ----------------------------<  107<H>  5.1   |  25  |  1.08    Ca    8.2<L>      21 Dec 2017 05:18  Phos  4.6     12-21  Mg     1.6     12-21

## 2017-12-22 LAB
ANION GAP SERPL CALC-SCNC: 8 MMOL/L — SIGNIFICANT CHANGE UP (ref 5–17)
BUN SERPL-MCNC: 19 MG/DL — SIGNIFICANT CHANGE UP (ref 7–23)
CALCIUM SERPL-MCNC: 8.1 MG/DL — LOW (ref 8.5–10.1)
CHLORIDE SERPL-SCNC: 107 MMOL/L — SIGNIFICANT CHANGE UP (ref 96–108)
CO2 SERPL-SCNC: 26 MMOL/L — SIGNIFICANT CHANGE UP (ref 22–31)
CREAT SERPL-MCNC: 0.89 MG/DL — SIGNIFICANT CHANGE UP (ref 0.5–1.3)
GLUCOSE BLDC GLUCOMTR-MCNC: 84 MG/DL — SIGNIFICANT CHANGE UP (ref 70–99)
GLUCOSE BLDC GLUCOMTR-MCNC: 87 MG/DL — SIGNIFICANT CHANGE UP (ref 70–99)
GLUCOSE BLDC GLUCOMTR-MCNC: 98 MG/DL — SIGNIFICANT CHANGE UP (ref 70–99)
GLUCOSE SERPL-MCNC: 91 MG/DL — SIGNIFICANT CHANGE UP (ref 70–99)
HCT VFR BLD CALC: 33.5 % — LOW (ref 39–50)
HGB BLD-MCNC: 10.9 G/DL — LOW (ref 13–17)
MAGNESIUM SERPL-MCNC: 1.8 MG/DL — SIGNIFICANT CHANGE UP (ref 1.6–2.6)
MCHC RBC-ENTMCNC: 30.6 PG — SIGNIFICANT CHANGE UP (ref 27–34)
MCHC RBC-ENTMCNC: 32.4 GM/DL — SIGNIFICANT CHANGE UP (ref 32–36)
MCV RBC AUTO: 94.5 FL — SIGNIFICANT CHANGE UP (ref 80–100)
PHOSPHATE SERPL-MCNC: 2.3 MG/DL — LOW (ref 2.5–4.5)
PLATELET # BLD AUTO: 139 K/UL — LOW (ref 150–400)
POTASSIUM SERPL-MCNC: 4 MMOL/L — SIGNIFICANT CHANGE UP (ref 3.5–5.3)
POTASSIUM SERPL-SCNC: 4 MMOL/L — SIGNIFICANT CHANGE UP (ref 3.5–5.3)
RBC # BLD: 3.55 M/UL — LOW (ref 4.2–5.8)
RBC # FLD: 11.9 % — SIGNIFICANT CHANGE UP (ref 10.3–14.5)
SODIUM SERPL-SCNC: 141 MMOL/L — SIGNIFICANT CHANGE UP (ref 135–145)
WBC # BLD: 6.1 K/UL — SIGNIFICANT CHANGE UP (ref 3.8–10.5)
WBC # FLD AUTO: 6.1 K/UL — SIGNIFICANT CHANGE UP (ref 3.8–10.5)

## 2017-12-22 RX ORDER — HEPARIN SODIUM 5000 [USP'U]/ML
5000 INJECTION INTRAVENOUS; SUBCUTANEOUS EVERY 12 HOURS
Qty: 0 | Refills: 0 | Status: DISCONTINUED | OUTPATIENT
Start: 2017-12-22 | End: 2017-12-25

## 2017-12-22 RX ADMIN — ATENOLOL 50 MILLIGRAM(S): 25 TABLET ORAL at 21:19

## 2017-12-22 RX ADMIN — AMLODIPINE BESYLATE 5 MILLIGRAM(S): 2.5 TABLET ORAL at 07:00

## 2017-12-22 RX ADMIN — BENZOCAINE AND MENTHOL 1 LOZENGE: 5; 1 LIQUID ORAL at 15:38

## 2017-12-22 RX ADMIN — Medication 62.5 MILLIMOLE(S): at 11:03

## 2017-12-22 RX ADMIN — HEPARIN SODIUM 5000 UNIT(S): 5000 INJECTION INTRAVENOUS; SUBCUTANEOUS at 18:09

## 2017-12-22 RX ADMIN — SODIUM CHLORIDE 125 MILLILITER(S): 9 INJECTION INTRAMUSCULAR; INTRAVENOUS; SUBCUTANEOUS at 18:10

## 2017-12-22 RX ADMIN — SODIUM CHLORIDE 125 MILLILITER(S): 9 INJECTION INTRAMUSCULAR; INTRAVENOUS; SUBCUTANEOUS at 07:01

## 2017-12-22 RX ADMIN — HEPARIN SODIUM 5000 UNIT(S): 5000 INJECTION INTRAVENOUS; SUBCUTANEOUS at 07:01

## 2017-12-22 RX ADMIN — PANTOPRAZOLE SODIUM 40 MILLIGRAM(S): 20 TABLET, DELAYED RELEASE ORAL at 11:03

## 2017-12-22 NOTE — PROGRESS NOTE ADULT - SUBJECTIVE AND OBJECTIVE BOX
POD 2,   feeling well, Passed some flatus yesterday  pain controlled    Vital Signs Last 24 Hrs  T(C): 36.6 (22 Dec 2017 04:10), Max: 37 (21 Dec 2017 23:35)  T(F): 97.9 (22 Dec 2017 04:10), Max: 98.6 (21 Dec 2017 23:35)  HR: 53 (22 Dec 2017 04:11) (50 - 66)  BP: 122/65 (22 Dec 2017 04:10) (122/65 - 138/75)  BP(mean): --  RR: 12 (22 Dec 2017 04:10) (12 - 18)  SpO2: 93% (22 Dec 2017 04:10) (93% - 95%)        NAD  abd soft, dressing changed, incision clean                          10.9   6.1   )-----------( 139      ( 22 Dec 2017 06:20 )             33.5   12-22    141  |  107  |  19  ----------------------------<  91  4.0   |  26  |  0.89    Ca    8.1<L>      22 Dec 2017 06:20  Phos  2.3     12-22  Mg     1.8     12-22

## 2017-12-22 NOTE — PROGRESS NOTE ADULT - ASSESSMENT
Recurrent SBO  S/p lap converted to open VICTORIA  improving    cont NGT one more day  OOB,ambulate  DVT prophylaxis Recurrent SBO  S/p lap converted to open VICTORIA  hypophosphatemia  improving    cont NGT one more day  OOB,ambulate  DVT prophylaxis  replace phos

## 2017-12-23 LAB
ANION GAP SERPL CALC-SCNC: 7 MMOL/L — SIGNIFICANT CHANGE UP (ref 5–17)
BUN SERPL-MCNC: 13 MG/DL — SIGNIFICANT CHANGE UP (ref 7–23)
CALCIUM SERPL-MCNC: 8.1 MG/DL — LOW (ref 8.5–10.1)
CHLORIDE SERPL-SCNC: 101 MMOL/L — SIGNIFICANT CHANGE UP (ref 96–108)
CO2 SERPL-SCNC: 32 MMOL/L — HIGH (ref 22–31)
CREAT SERPL-MCNC: 0.69 MG/DL — SIGNIFICANT CHANGE UP (ref 0.5–1.3)
GLUCOSE BLDC GLUCOMTR-MCNC: 86 MG/DL — SIGNIFICANT CHANGE UP (ref 70–99)
GLUCOSE SERPL-MCNC: 80 MG/DL — SIGNIFICANT CHANGE UP (ref 70–99)
MAGNESIUM SERPL-MCNC: 1.6 MG/DL — SIGNIFICANT CHANGE UP (ref 1.6–2.6)
PHOSPHATE SERPL-MCNC: 1.9 MG/DL — LOW (ref 2.5–4.5)
POTASSIUM SERPL-MCNC: 4.1 MMOL/L — SIGNIFICANT CHANGE UP (ref 3.5–5.3)
POTASSIUM SERPL-SCNC: 4.1 MMOL/L — SIGNIFICANT CHANGE UP (ref 3.5–5.3)
SODIUM SERPL-SCNC: 140 MMOL/L — SIGNIFICANT CHANGE UP (ref 135–145)

## 2017-12-23 RX ORDER — SODIUM CHLORIDE 9 MG/ML
1000 INJECTION INTRAMUSCULAR; INTRAVENOUS; SUBCUTANEOUS
Qty: 0 | Refills: 0 | Status: DISCONTINUED | OUTPATIENT
Start: 2017-12-23 | End: 2017-12-24

## 2017-12-23 RX ORDER — MORPHINE SULFATE 50 MG/1
2 CAPSULE, EXTENDED RELEASE ORAL EVERY 4 HOURS
Qty: 0 | Refills: 0 | Status: DISCONTINUED | OUTPATIENT
Start: 2017-12-23 | End: 2017-12-25

## 2017-12-23 RX ORDER — MAGNESIUM OXIDE 400 MG ORAL TABLET 241.3 MG
400 TABLET ORAL ONCE
Qty: 0 | Refills: 0 | Status: COMPLETED | OUTPATIENT
Start: 2017-12-23 | End: 2017-12-23

## 2017-12-23 RX ORDER — SODIUM,POTASSIUM PHOSPHATES 278-250MG
1 POWDER IN PACKET (EA) ORAL
Qty: 0 | Refills: 0 | Status: COMPLETED | OUTPATIENT
Start: 2017-12-23 | End: 2017-12-24

## 2017-12-23 RX ADMIN — MAGNESIUM OXIDE 400 MG ORAL TABLET 400 MILLIGRAM(S): 241.3 TABLET ORAL at 11:23

## 2017-12-23 RX ADMIN — Medication 1 PACKET(S): at 17:24

## 2017-12-23 RX ADMIN — SODIUM CHLORIDE 125 MILLILITER(S): 9 INJECTION INTRAMUSCULAR; INTRAVENOUS; SUBCUTANEOUS at 03:21

## 2017-12-23 RX ADMIN — PANTOPRAZOLE SODIUM 40 MILLIGRAM(S): 20 TABLET, DELAYED RELEASE ORAL at 11:23

## 2017-12-23 RX ADMIN — HEPARIN SODIUM 5000 UNIT(S): 5000 INJECTION INTRAVENOUS; SUBCUTANEOUS at 17:24

## 2017-12-23 RX ADMIN — SODIUM CHLORIDE 80 MILLILITER(S): 9 INJECTION INTRAMUSCULAR; INTRAVENOUS; SUBCUTANEOUS at 15:41

## 2017-12-23 RX ADMIN — AMLODIPINE BESYLATE 5 MILLIGRAM(S): 2.5 TABLET ORAL at 05:16

## 2017-12-23 RX ADMIN — ATENOLOL 50 MILLIGRAM(S): 25 TABLET ORAL at 22:04

## 2017-12-23 RX ADMIN — HEPARIN SODIUM 5000 UNIT(S): 5000 INJECTION INTRAVENOUS; SUBCUTANEOUS at 05:16

## 2017-12-23 NOTE — DIETITIAN INITIAL EVALUATION ADULT. - ORAL INTAKE PTA
Pt's intake over past 2 weeks has been very poor pt has been NPO for 7 days and on clear liquids 1 day./poor

## 2017-12-23 NOTE — PROGRESS NOTE ADULT - ASSESSMENT
POD 3    D/C PCA. prn oxycodone.  Advance to clear liquid diet.  Decrease IVF. POD 3    D/C PCA.    Advance to clear liquid diet.  Decrease IVF.

## 2017-12-23 NOTE — DIETITIAN INITIAL EVALUATION ADULT. - ADHERENCE
Pt reports following the 30 diet at home (approved by MD). Diet was used for weight loss, has not been following lately./n/a

## 2017-12-23 NOTE — DIETITIAN INITIAL EVALUATION ADULT. - SIGNS/SYMPTOMS
Significant weight loss, Po intake <75% nutrient needs for 7 day or greater, visual wasting, +1edema

## 2017-12-23 NOTE — PROGRESS NOTE ADULT - SUBJECTIVE AND OBJECTIVE BOX
No c/o. Passed small amt of flatus.     MEDICATIONS  (STANDING):  amLODIPine   Tablet 5 milliGRAM(s) Oral daily  ATENolol  Tablet 50 milliGRAM(s) Oral at bedtime  heparin  Injectable 5000 Unit(s) SubCutaneous every 12 hours  HYDROmorphone PCA (1 mG/mL) 30 milliLiter(s) PCA Continuous PCA Continuous  magnesium oxide 400 milliGRAM(s) Oral once  pantoprazole  Injectable 40 milliGRAM(s) IV Push daily  potassium phosphate / sodium phosphate powder 1 Packet(s) Oral two times a day  sodium chloride 0.9%. 1000 milliLiter(s) (125 mL/Hr) IV Continuous <Continuous>    MEDICATIONS  (PRN):  benzocaine 15 mG/menthol 3.6 mG Lozenge 1 Lozenge Oral every 6 hours PRN Sore Throat  diphenhydrAMINE   Injectable 25 milliGRAM(s) IV Push every 4 hours PRN Pruritus  HYDROmorphone PCA (1 mG/mL) Rescue Clinician Bolus 0.5 milliGRAM(s) IV Push every 15 minutes PRN for Pain Scale GREATER THAN 6  naloxone Injectable 0.1 milliGRAM(s) IV Push every 3 minutes PRN For ANY of the following changes in patient status:  A. RR LESS THAN 10 breaths per minute, B. Oxygen saturation LESS THAN 90%, C. Sedation score of 6  ondansetron Injectable 4 milliGRAM(s) IV Push every 6 hours PRN Nausea  ondansetron Injectable 4 milliGRAM(s) IV Push every 6 hours PRN Nausea  prochlorperazine   Injectable 10 milliGRAM(s) IV Push every 6 hours PRN Nausea, if ondansetron is ineffective    Vital Signs Last 24 Hrs  T(C): 36.6 (23 Dec 2017 04:17), Max: 36.7 (22 Dec 2017 14:24)  T(F): 97.8 (23 Dec 2017 04:17), Max: 98.1 (22 Dec 2017 20:22)  HR: 55 (23 Dec 2017 04:17) (54 - 60)  BP: 144/73 (23 Dec 2017 04:17) (127/69 - 144/73)  BP(mean): --  RR: 16 (23 Dec 2017 00:12) (14 - 16)  SpO2: 96% (23 Dec 2017 04:17) (91% - 96%)  I&O's Detail    22 Dec 2017 07:01  -  23 Dec 2017 07:00  --------------------------------------------------------  IN:    sodium chloride 0.9%.: 2811 mL  Total IN: 2811 mL    OUT:    Drain: 700 mL    Voided: 1000 mL  Total OUT: 1700 mL    Total NET: 1111 mL    NAD  ABD exam :soft, NT, mild distention                        10.9   6.1   )-----------( 139      ( 22 Dec 2017 06:20 )             33.5     12-23    140  |  101  |  13  ----------------------------<  80  4.1   |  32<H>  |  0.69    Ca    8.1<L>      23 Dec 2017 05:39  Phos  1.9     12-23  Mg     1.6     12-23

## 2017-12-23 NOTE — CHART NOTE - NSCHARTNOTEFT_GEN_A_CORE
Upon Nutritional Assessment by the Registered Dietitian your patient was determined to meet criteria / has evidence of the following diagnosis/diagnoses:          [ ]  Mild Protein Calorie Malnutrition        [x]  Moderate Protein Calorie Malnutrition        [ ] Severe Protein Calorie Malnutrition        [ ] Unspecified Protein Calorie Malnutrition        [ ] Underweight / BMI <19        [ ] Morbid Obesity / BMI > 40      Findings as based on:  •  Comprehensive nutrition assessment and consultation  •  Calorie counts (nutrient intake analysis)  •  Food acceptance and intake status from observations by staff  •  Follow up  •  Patient education  •  Intervention secondary to interdisciplinary rounds  •   concerns      Treatment:    The following diet has been recommended:  -Advance diet as medically feasible  -Ensure enlive TID    PROVIDER Section:     By signing this assessment you are acknowledging and agree with the diagnosis/diagnoses assigned by the Registered Dietitian    Comments:

## 2017-12-23 NOTE — DIETITIAN INITIAL EVALUATION ADULT. - OTHER INFO
Pt seen for length of stay. Pt's Pmh/x noted below. Pt denies chewing and swallowing difficulty. Pt has had issues with SBO over past 2 weeks, PO intake has been poor. During hospital stay pt NPO for 6 days, recently advanced to clears. Pt's PO intake has been <75% of needs over 7 days, Pt has lost 3% of body weight over the past 2 weeks while NPO. Pt dose not show clear signs of muscle or fat wasting, but pt states he feels his arms and legs are thinner. Pt also presents with +1 trace edema. Pt meets criteria for moderate protein-calorie malnutrition in context of acute illness. Recommend 1.) advanceing diet as tolerated 2.) Ensure enlive TID when medically feasible. 3) MVI 4.) Replete Po4 5.) current weight and monitor for changes and to assess adequacy of diet.

## 2017-12-24 LAB
ANION GAP SERPL CALC-SCNC: 7 MMOL/L — SIGNIFICANT CHANGE UP (ref 5–17)
BUN SERPL-MCNC: 7 MG/DL — SIGNIFICANT CHANGE UP (ref 7–23)
CALCIUM SERPL-MCNC: 8.6 MG/DL — SIGNIFICANT CHANGE UP (ref 8.5–10.1)
CHLORIDE SERPL-SCNC: 105 MMOL/L — SIGNIFICANT CHANGE UP (ref 96–108)
CO2 SERPL-SCNC: 30 MMOL/L — SIGNIFICANT CHANGE UP (ref 22–31)
CREAT SERPL-MCNC: 0.57 MG/DL — SIGNIFICANT CHANGE UP (ref 0.5–1.3)
GLUCOSE SERPL-MCNC: 107 MG/DL — HIGH (ref 70–99)
HCT VFR BLD CALC: 37.8 % — LOW (ref 39–50)
HGB BLD-MCNC: 12.6 G/DL — LOW (ref 13–17)
MAGNESIUM SERPL-MCNC: 1.7 MG/DL — SIGNIFICANT CHANGE UP (ref 1.6–2.6)
MCHC RBC-ENTMCNC: 30.8 PG — SIGNIFICANT CHANGE UP (ref 27–34)
MCHC RBC-ENTMCNC: 33.2 GM/DL — SIGNIFICANT CHANGE UP (ref 32–36)
MCV RBC AUTO: 92.8 FL — SIGNIFICANT CHANGE UP (ref 80–100)
PHOSPHATE SERPL-MCNC: 2.3 MG/DL — LOW (ref 2.5–4.5)
PLATELET # BLD AUTO: 146 K/UL — LOW (ref 150–400)
POTASSIUM SERPL-MCNC: 3.6 MMOL/L — SIGNIFICANT CHANGE UP (ref 3.5–5.3)
POTASSIUM SERPL-SCNC: 3.6 MMOL/L — SIGNIFICANT CHANGE UP (ref 3.5–5.3)
RBC # BLD: 4.08 M/UL — LOW (ref 4.2–5.8)
RBC # FLD: 11.8 % — SIGNIFICANT CHANGE UP (ref 10.3–14.5)
SODIUM SERPL-SCNC: 142 MMOL/L — SIGNIFICANT CHANGE UP (ref 135–145)
WBC # BLD: 5.4 K/UL — SIGNIFICANT CHANGE UP (ref 3.8–10.5)
WBC # FLD AUTO: 5.4 K/UL — SIGNIFICANT CHANGE UP (ref 3.8–10.5)

## 2017-12-24 RX ORDER — MAGNESIUM OXIDE 400 MG ORAL TABLET 241.3 MG
400 TABLET ORAL ONCE
Qty: 0 | Refills: 0 | Status: COMPLETED | OUTPATIENT
Start: 2017-12-24 | End: 2017-12-24

## 2017-12-24 RX ORDER — SODIUM CHLORIDE 9 MG/ML
1000 INJECTION INTRAMUSCULAR; INTRAVENOUS; SUBCUTANEOUS
Qty: 0 | Refills: 0 | Status: DISCONTINUED | OUTPATIENT
Start: 2017-12-24 | End: 2017-12-25

## 2017-12-24 RX ORDER — SODIUM,POTASSIUM PHOSPHATES 278-250MG
1 POWDER IN PACKET (EA) ORAL
Qty: 0 | Refills: 0 | Status: COMPLETED | OUTPATIENT
Start: 2017-12-24 | End: 2017-12-25

## 2017-12-24 RX ADMIN — Medication 1 PACKET(S): at 18:58

## 2017-12-24 RX ADMIN — SODIUM CHLORIDE 80 MILLILITER(S): 9 INJECTION INTRAMUSCULAR; INTRAVENOUS; SUBCUTANEOUS at 05:11

## 2017-12-24 RX ADMIN — Medication 1 PACKET(S): at 21:24

## 2017-12-24 RX ADMIN — PANTOPRAZOLE SODIUM 40 MILLIGRAM(S): 20 TABLET, DELAYED RELEASE ORAL at 14:48

## 2017-12-24 RX ADMIN — Medication 1 PACKET(S): at 14:49

## 2017-12-24 RX ADMIN — SODIUM CHLORIDE 50 MILLILITER(S): 9 INJECTION INTRAMUSCULAR; INTRAVENOUS; SUBCUTANEOUS at 18:58

## 2017-12-24 RX ADMIN — ATENOLOL 50 MILLIGRAM(S): 25 TABLET ORAL at 21:24

## 2017-12-24 RX ADMIN — Medication 1 PACKET(S): at 05:10

## 2017-12-24 RX ADMIN — MAGNESIUM OXIDE 400 MG ORAL TABLET 400 MILLIGRAM(S): 241.3 TABLET ORAL at 14:48

## 2017-12-24 RX ADMIN — HEPARIN SODIUM 5000 UNIT(S): 5000 INJECTION INTRAVENOUS; SUBCUTANEOUS at 18:57

## 2017-12-24 RX ADMIN — HEPARIN SODIUM 5000 UNIT(S): 5000 INJECTION INTRAVENOUS; SUBCUTANEOUS at 05:10

## 2017-12-24 RX ADMIN — AMLODIPINE BESYLATE 5 MILLIGRAM(S): 2.5 TABLET ORAL at 05:10

## 2017-12-25 ENCOUNTER — TRANSCRIPTION ENCOUNTER (OUTPATIENT)
Age: 77
End: 2017-12-25

## 2017-12-25 VITALS
OXYGEN SATURATION: 99 % | HEART RATE: 67 BPM | DIASTOLIC BLOOD PRESSURE: 74 MMHG | SYSTOLIC BLOOD PRESSURE: 133 MMHG | TEMPERATURE: 98 F | RESPIRATION RATE: 18 BRPM

## 2017-12-25 LAB
ANION GAP SERPL CALC-SCNC: 9 MMOL/L — SIGNIFICANT CHANGE UP (ref 5–17)
BUN SERPL-MCNC: 10 MG/DL — SIGNIFICANT CHANGE UP (ref 7–23)
CALCIUM SERPL-MCNC: 8.8 MG/DL — SIGNIFICANT CHANGE UP (ref 8.5–10.1)
CHLORIDE SERPL-SCNC: 106 MMOL/L — SIGNIFICANT CHANGE UP (ref 96–108)
CO2 SERPL-SCNC: 29 MMOL/L — SIGNIFICANT CHANGE UP (ref 22–31)
CREAT SERPL-MCNC: 0.74 MG/DL — SIGNIFICANT CHANGE UP (ref 0.5–1.3)
GLUCOSE SERPL-MCNC: 95 MG/DL — SIGNIFICANT CHANGE UP (ref 70–99)
HCT VFR BLD CALC: 38.4 % — LOW (ref 39–50)
HGB BLD-MCNC: 12.6 G/DL — LOW (ref 13–17)
MAGNESIUM SERPL-MCNC: 1.6 MG/DL — SIGNIFICANT CHANGE UP (ref 1.6–2.6)
MCHC RBC-ENTMCNC: 30.7 PG — SIGNIFICANT CHANGE UP (ref 27–34)
MCHC RBC-ENTMCNC: 32.8 GM/DL — SIGNIFICANT CHANGE UP (ref 32–36)
MCV RBC AUTO: 93.6 FL — SIGNIFICANT CHANGE UP (ref 80–100)
PHOSPHATE SERPL-MCNC: 3.2 MG/DL — SIGNIFICANT CHANGE UP (ref 2.5–4.5)
PLATELET # BLD AUTO: 137 K/UL — LOW (ref 150–400)
POTASSIUM SERPL-MCNC: 4.1 MMOL/L — SIGNIFICANT CHANGE UP (ref 3.5–5.3)
POTASSIUM SERPL-SCNC: 4.1 MMOL/L — SIGNIFICANT CHANGE UP (ref 3.5–5.3)
RBC # BLD: 4.1 M/UL — LOW (ref 4.2–5.8)
RBC # FLD: 11.6 % — SIGNIFICANT CHANGE UP (ref 10.3–14.5)
SODIUM SERPL-SCNC: 144 MMOL/L — SIGNIFICANT CHANGE UP (ref 135–145)
WBC # BLD: 6 K/UL — SIGNIFICANT CHANGE UP (ref 3.8–10.5)
WBC # FLD AUTO: 6 K/UL — SIGNIFICANT CHANGE UP (ref 3.8–10.5)

## 2017-12-25 RX ORDER — LOPERAMIDE HCL 2 MG
1 TABLET ORAL
Qty: 0 | Refills: 0 | COMMUNITY

## 2017-12-25 RX ORDER — MAGNESIUM OXIDE 400 MG ORAL TABLET 241.3 MG
400 TABLET ORAL ONCE
Qty: 0 | Refills: 0 | Status: COMPLETED | OUTPATIENT
Start: 2017-12-25 | End: 2017-12-25

## 2017-12-25 RX ORDER — LOPERAMIDE HCL 2 MG
2 TABLET ORAL
Qty: 0 | Refills: 0 | COMMUNITY

## 2017-12-25 RX ADMIN — HEPARIN SODIUM 5000 UNIT(S): 5000 INJECTION INTRAVENOUS; SUBCUTANEOUS at 05:53

## 2017-12-25 RX ADMIN — MAGNESIUM OXIDE 400 MG ORAL TABLET 400 MILLIGRAM(S): 241.3 TABLET ORAL at 10:32

## 2017-12-25 RX ADMIN — Medication 1 PACKET(S): at 05:53

## 2017-12-25 RX ADMIN — AMLODIPINE BESYLATE 5 MILLIGRAM(S): 2.5 TABLET ORAL at 05:54

## 2017-12-25 NOTE — DISCHARGE NOTE ADULT - MEDICATION SUMMARY - MEDICATIONS TO TAKE
I will START or STAY ON the medications listed below when I get home from the hospital:    aspirin 81 mg oral tablet  -- 1 tab(s) by mouth once a day  -- Indication: For Home med    Os-Sukhdeep 500 (1250 mg calcium carbonate) oral tablet  -- 1 tab(s) by mouth once a day  -- Indication: For Home med    Lipitor 20 mg oral tablet  -- 1 tab(s) by mouth once a day (at bedtime)  -- Indication: For Home med    atenolol 50 mg oral tablet  -- 1 tab(s) by mouth once a day (at bedtime)  -- Indication: For Home med    Norvasc 5 mg oral tablet  -- 1 tab(s) by mouth once a day  -- Indication: For Home med    magnesium oxide 500 mg oral tablet  -- 1 tab(s) by mouth once a day  -- Indication: For Home med    biotin 1000 mcg oral tablet  -- 1 tab(s) by mouth once a day  -- Indication: For Home med    cholecalciferol 1000 intl units oral tablet  -- 1 tab(s) by mouth once a day  -- Indication: For Home med

## 2017-12-25 NOTE — DISCHARGE NOTE ADULT - PATIENT PORTAL LINK FT
“You can access the FollowHealth Patient Portal, offered by [facility], by registering with the following website: [email or web address]”

## 2017-12-25 NOTE — DISCHARGE NOTE ADULT - CARE PROVIDER_API CALL
Cesar Razo), ColonRectal Surgery; Surgery  [address] [place] [address]  Phone: [phone]  Fax: [phone]

## 2017-12-25 NOTE — DISCHARGE NOTE ADULT - MEDICATION SUMMARY - MEDICATIONS TO CHANGE
I will SWITCH the dose or number of times a day I take the medications listed below when I get home from the hospital:    Imodium 2 mg oral capsule  -- 2 cap(s) by mouth once a day    Imodium 2 mg oral capsule  -- 1 cap(s) by mouth once a day (at bedtime)

## 2017-12-25 NOTE — DISCHARGE NOTE ADULT - HOSPITAL COURSE
Mr. Gorman was admitted on 12/17/17 for recurrent SBO. He underwent ex-lap on 12/20/17 with VICTORIA. Postop, his NGT was removed POD 2, his diet was advanced daily beginning POD 3 so that by POD 5, he was tolerating a low residue diet without N/V/abd pain and passing flatus. His vital signs, labwork and abdominal exam were all WNL. As a result, he was deemed stable for discharge.

## 2017-12-25 NOTE — DISCHARGE NOTE ADULT - CARE PLAN
Principal Discharge DX:	Small bowel obstruction  Goal:	Recover  Instructions for follow-up, activity and diet:	Followup in office in 2 weeks' time

## 2017-12-26 LAB — SURGICAL PATHOLOGY FINAL REPORT - CH: SIGNIFICANT CHANGE UP

## 2018-01-02 DIAGNOSIS — E83.39 OTHER DISORDERS OF PHOSPHORUS METABOLISM: ICD-10-CM

## 2018-01-02 DIAGNOSIS — Z92.3 PERSONAL HISTORY OF IRRADIATION: ICD-10-CM

## 2018-01-02 DIAGNOSIS — E78.5 HYPERLIPIDEMIA, UNSPECIFIED: ICD-10-CM

## 2018-01-02 DIAGNOSIS — Z85.048 PERSONAL HISTORY OF OTHER MALIGNANT NEOPLASM OF RECTUM, RECTOSIGMOID JUNCTION, AND ANUS: ICD-10-CM

## 2018-01-02 DIAGNOSIS — Z90.49 ACQUIRED ABSENCE OF OTHER SPECIFIED PARTS OF DIGESTIVE TRACT: ICD-10-CM

## 2018-01-02 DIAGNOSIS — Z87.891 PERSONAL HISTORY OF NICOTINE DEPENDENCE: ICD-10-CM

## 2018-01-02 DIAGNOSIS — Z79.82 LONG TERM (CURRENT) USE OF ASPIRIN: ICD-10-CM

## 2018-01-02 DIAGNOSIS — K66.8 OTHER SPECIFIED DISORDERS OF PERITONEUM: ICD-10-CM

## 2018-01-02 DIAGNOSIS — E44.0 MODERATE PROTEIN-CALORIE MALNUTRITION: ICD-10-CM

## 2018-01-02 DIAGNOSIS — K56.609 UNSPECIFIED INTESTINAL OBSTRUCTION, UNSPECIFIED AS TO PARTIAL VERSUS COMPLETE OBSTRUCTION: ICD-10-CM

## 2018-01-02 DIAGNOSIS — I10 ESSENTIAL (PRIMARY) HYPERTENSION: ICD-10-CM

## 2018-01-02 DIAGNOSIS — K56.51 INTESTINAL ADHESIONS [BANDS], WITH PARTIAL OBSTRUCTION: ICD-10-CM

## 2018-01-08 ENCOUNTER — APPOINTMENT (OUTPATIENT)
Dept: FAMILY MEDICINE | Facility: CLINIC | Age: 78
End: 2018-01-08
Payer: MEDICARE

## 2018-01-08 VITALS
WEIGHT: 178 LBS | HEIGHT: 69 IN | BODY MASS INDEX: 26.36 KG/M2 | HEART RATE: 85 BPM | SYSTOLIC BLOOD PRESSURE: 142 MMHG | OXYGEN SATURATION: 98 % | DIASTOLIC BLOOD PRESSURE: 80 MMHG

## 2018-01-08 DIAGNOSIS — K56.600 PARTIAL INTESTINAL OBSTRUCTION, UNSPECIFIED AS TO CAUSE: ICD-10-CM

## 2018-01-08 PROCEDURE — 99495 TRANSJ CARE MGMT MOD F2F 14D: CPT

## 2018-01-10 ENCOUNTER — APPOINTMENT (OUTPATIENT)
Dept: COLORECTAL SURGERY | Facility: CLINIC | Age: 78
End: 2018-01-10
Payer: MEDICARE

## 2018-01-10 VITALS
SYSTOLIC BLOOD PRESSURE: 120 MMHG | HEIGHT: 69 IN | RESPIRATION RATE: 14 BRPM | DIASTOLIC BLOOD PRESSURE: 78 MMHG | BODY MASS INDEX: 26.36 KG/M2 | TEMPERATURE: 97.8 F | WEIGHT: 178 LBS

## 2018-01-10 PROCEDURE — 99024 POSTOP FOLLOW-UP VISIT: CPT

## 2018-01-31 ENCOUNTER — APPOINTMENT (OUTPATIENT)
Dept: COLORECTAL SURGERY | Facility: CLINIC | Age: 78
End: 2018-01-31
Payer: MEDICARE

## 2018-01-31 DIAGNOSIS — Z09 ENCOUNTER FOR FOLLOW-UP EXAMINATION AFTER COMPLETED TREATMENT FOR CONDITIONS OTHER THAN MALIGNANT NEOPLASM: ICD-10-CM

## 2018-01-31 PROCEDURE — 99024 POSTOP FOLLOW-UP VISIT: CPT

## 2018-02-07 ENCOUNTER — RX RENEWAL (OUTPATIENT)
Age: 78
End: 2018-02-07

## 2018-08-16 ENCOUNTER — RX RENEWAL (OUTPATIENT)
Age: 78
End: 2018-08-16

## 2018-11-01 NOTE — PATIENT PROFILE ADULT. - PAIN SCALE PREFERRED, PROFILE
OP Anticoagulation Telephone Note    Date: 11/1/2018  Anticoagulation Summary  As of 11/1/2018    INR goal:   2.0-3.0   TTR:   55.7 % (3.4 y)   Today's INR:   2.7 (10/31/2018)   Warfarin maintenance plan:   5 mg (5 mg x 1) every day   Weekly warfarin total:   35 mg   No change documented:   Sophia Alonso, Med Ass't   Plan last modified:   Karen Ignacio, PharmD (8/30/2018)   Next INR check:   11/15/2018   Priority:   Maintenance   Target end date:   Indefinite    Indications    Deep vein thrombosis [453.40] [I82.409]             Anticoagulation Episode Summary     INR check location:   Home Draw    Preferred lab:       Send INR reminders to:       Comments:   Eloy MYRICK      Anticoagulation Care Providers     Provider Role Specialty Phone number    Bruna Ureña M.D. Referring Cardiology [phone]    Babita Sarmiento Responsible      [facility] Responsible  [phone]        Anticoagulation Patient Findings  Patient Findings     Negatives:   Signs/symptoms of thrombosis, Signs/symptoms of bleeding, Laboratory test error suspected, Change in health, Change in alcohol use, Change in activity, Upcoming invasive procedure, Emergency department visit, Upcoming dental procedure, Missed doses, Extra doses, Change in medications, Change in diet/appetite, Hospital admission, Bruising, Other complaints      Plan:  Left message on patient's answering machine/ voicemail. Instructed patient to call back with any concerns regarding any unusual bleeding or bruising, any medication or diet changes, or any signs or symptoms of thrombosis. Instructed patient to resume medication as outlined above. Patient to follow up in 2 weeks.     Sophia Alonso, Medical Assistant    I have reviewed and concur with the above plan     Abhi Carlos, PharmD       numerical 0-10

## 2018-11-15 ENCOUNTER — APPOINTMENT (OUTPATIENT)
Dept: FAMILY MEDICINE | Facility: CLINIC | Age: 78
End: 2018-11-15
Payer: MEDICARE

## 2018-11-15 VITALS
HEART RATE: 93 BPM | OXYGEN SATURATION: 96 % | BODY MASS INDEX: 28.88 KG/M2 | HEIGHT: 69 IN | DIASTOLIC BLOOD PRESSURE: 102 MMHG | SYSTOLIC BLOOD PRESSURE: 158 MMHG | WEIGHT: 195 LBS

## 2018-11-15 DIAGNOSIS — N52.9 MALE ERECTILE DYSFUNCTION, UNSPECIFIED: ICD-10-CM

## 2018-11-15 DIAGNOSIS — M79.1 MYALGIA: ICD-10-CM

## 2018-11-15 DIAGNOSIS — I10 ESSENTIAL (PRIMARY) HYPERTENSION: ICD-10-CM

## 2018-11-15 DIAGNOSIS — E78.5 HYPERLIPIDEMIA, UNSPECIFIED: ICD-10-CM

## 2018-11-15 DIAGNOSIS — Z00.00 ENCOUNTER FOR GENERAL ADULT MEDICAL EXAMINATION W/OUT ABNORMAL FINDINGS: ICD-10-CM

## 2018-11-15 PROCEDURE — G0439: CPT

## 2018-11-15 PROCEDURE — 36415 COLL VENOUS BLD VENIPUNCTURE: CPT

## 2018-11-15 RX ORDER — MAGNESIUM OXIDE/MAG AA CHELATE 300 MG
CAPSULE ORAL
Refills: 0 | Status: DISCONTINUED | COMMUNITY
End: 2018-11-15

## 2018-11-15 RX ORDER — AZITHROMYCIN 250 MG/1
250 TABLET, FILM COATED ORAL
Qty: 1 | Refills: 0 | Status: DISCONTINUED | COMMUNITY
Start: 2018-01-12 | End: 2018-11-15

## 2018-11-16 LAB
BASOPHILS # BLD AUTO: 0.01 K/UL
BASOPHILS NFR BLD AUTO: 0.1 %
EOSINOPHIL # BLD AUTO: 0.15 K/UL
EOSINOPHIL NFR BLD AUTO: 2.2 %
HCT VFR BLD CALC: 46.9 %
HGB BLD-MCNC: 15.1 G/DL
IMM GRANULOCYTES NFR BLD AUTO: 0.3 %
LYMPHOCYTES # BLD AUTO: 1.55 K/UL
LYMPHOCYTES NFR BLD AUTO: 22.7 %
MAN DIFF?: NORMAL
MCHC RBC-ENTMCNC: 30.9 PG
MCHC RBC-ENTMCNC: 32.2 GM/DL
MCV RBC AUTO: 95.9 FL
MONOCYTES # BLD AUTO: 0.73 K/UL
MONOCYTES NFR BLD AUTO: 10.7 %
NEUTROPHILS # BLD AUTO: 4.38 K/UL
NEUTROPHILS NFR BLD AUTO: 64 %
PLATELET # BLD AUTO: 158 K/UL
RBC # BLD: 4.89 M/UL
RBC # FLD: 13.3 %
WBC # FLD AUTO: 6.84 K/UL

## 2018-11-19 LAB
25(OH)D3 SERPL-MCNC: 35.5 NG/ML
ALBUMIN SERPL ELPH-MCNC: 4.7 G/DL
ALP BLD-CCNC: 84 U/L
ALT SERPL-CCNC: 26 U/L
ANION GAP SERPL CALC-SCNC: 17 MMOL/L
APPEARANCE: CLEAR
AST SERPL-CCNC: 34 U/L
BACTERIA: NEGATIVE
BILIRUB SERPL-MCNC: 0.5 MG/DL
BILIRUBIN URINE: NEGATIVE
BLOOD URINE: NEGATIVE
BUN SERPL-MCNC: 25 MG/DL
CALCIUM SERPL-MCNC: 10.3 MG/DL
CHLORIDE SERPL-SCNC: 101 MMOL/L
CHOLEST SERPL-MCNC: 236 MG/DL
CHOLEST/HDLC SERPL: 2.2 RATIO
CK SERPL-CCNC: 232 U/L
CO2 SERPL-SCNC: 24 MMOL/L
COLOR: YELLOW
CREAT SERPL-MCNC: 1.04 MG/DL
GLUCOSE QUALITATIVE U: NEGATIVE MG/DL
GLUCOSE SERPL-MCNC: 94 MG/DL
HBA1C MFR BLD HPLC: 5.7 %
HDLC SERPL-MCNC: 108 MG/DL
HYALINE CASTS: 1 /LPF
KETONES URINE: NEGATIVE
LDLC SERPL CALC-MCNC: 102 MG/DL
LEUKOCYTE ESTERASE URINE: NEGATIVE
MAGNESIUM SERPL-MCNC: 2.1 MG/DL
MICROSCOPIC-UA: NORMAL
NITRITE URINE: NEGATIVE
PH URINE: 5.5
POTASSIUM SERPL-SCNC: 5 MMOL/L
PROT SERPL-MCNC: 7.2 G/DL
PROTEIN URINE: 100 MG/DL
PSA FREE FLD-MCNC: 12.9
PSA FREE SERPL-MCNC: 0.11 NG/ML
PSA SERPL-MCNC: 0.85 NG/ML
RED BLOOD CELLS URINE: 2 /HPF
SODIUM SERPL-SCNC: 142 MMOL/L
SPECIFIC GRAVITY URINE: 1.02
SQUAMOUS EPITHELIAL CELLS: 0 /HPF
TRIGL SERPL-MCNC: 130 MG/DL
TSH SERPL-ACNC: 4.02 UIU/ML
URATE SERPL-MCNC: 6.3 MG/DL
UROBILINOGEN URINE: NEGATIVE MG/DL
WHITE BLOOD CELLS URINE: 0 /HPF

## 2018-11-20 DIAGNOSIS — R74.9 ABNORMAL SERUM ENZYME LEVEL, UNSPECIFIED: ICD-10-CM

## 2018-11-28 PROBLEM — N52.9 ERECTILE DYSFUNCTION: Status: ACTIVE | Noted: 2018-11-28

## 2018-11-28 NOTE — HISTORY OF PRESENT ILLNESS
[FreeTextEntry1] : Patient here for Annual Medicare Exam. Patient c/o leg and thigh cramps. Patient said feet feel spongy might be from Chemotherapy from 2015. Patient would also like to discuss ED

## 2018-12-06 LAB — CK SERPL-CCNC: 182 U/L

## 2018-12-11 ENCOUNTER — TRANSCRIPTION ENCOUNTER (OUTPATIENT)
Age: 78
End: 2018-12-11

## 2018-12-19 ENCOUNTER — RX RENEWAL (OUTPATIENT)
Age: 78
End: 2018-12-19

## 2019-01-09 ENCOUNTER — APPOINTMENT (OUTPATIENT)
Dept: COLORECTAL SURGERY | Facility: CLINIC | Age: 79
End: 2019-01-09
Payer: MEDICARE

## 2019-01-09 DIAGNOSIS — R10.31 RIGHT LOWER QUADRANT PAIN: ICD-10-CM

## 2019-01-09 PROCEDURE — 99203 OFFICE O/P NEW LOW 30 MIN: CPT

## 2019-01-09 NOTE — ASSESSMENT
[FreeTextEntry1] : 78M w/ hx of LAR and ex lap with VICTORIA p/w right groin pain concerning for inguinal hernia. Patient does not have GI symptoms. I will acquire a CT of the pelvis. I informed the patient that I do not perform inguinal hernia surgery if that is the case but will refer him to general surgery once the CT confirms the diagnosis.

## 2019-01-09 NOTE — PHYSICAL EXAM
[Respiratory Effort] : normal respiratory effort [Normal Rate and Rhythm] : normal rate and rhythm [No Rash or Lesion] : No rash or lesion [Alert] : alert [Oriented to Person] : oriented to person [Oriented to Place] : oriented to place [Calm] : calm [Abdomen Masses] : No abdominal masses [Abdomen Tenderness] : ~T No ~M abdominal tenderness [Tender] : nontender [de-identified] : Point tenderness at right inguinal ring, small hernia appreciated [de-identified] : NAD [de-identified] :  normocephalic, atraumatic.

## 2019-01-09 NOTE — HISTORY OF PRESENT ILLNESS
[FreeTextEntry1] : 78M w/ hx of rectal ca s/p resection in 2015 and VICTORIA in 2017 p/w several day hx of right sided back pain and right groin pain. Denies nausea, vomiting, or changes in bowel habits. Pain is pronounced when bending over and improves when sitting or laying down. Denies fever.

## 2019-01-10 ENCOUNTER — OUTPATIENT (OUTPATIENT)
Dept: OUTPATIENT SERVICES | Facility: HOSPITAL | Age: 79
LOS: 1 days | End: 2019-01-10
Payer: MEDICARE

## 2019-01-10 ENCOUNTER — APPOINTMENT (OUTPATIENT)
Dept: CT IMAGING | Facility: CLINIC | Age: 79
End: 2019-01-10
Payer: MEDICARE

## 2019-01-10 DIAGNOSIS — Z98.890 OTHER SPECIFIED POSTPROCEDURAL STATES: Chronic | ICD-10-CM

## 2019-01-10 DIAGNOSIS — Z90.49 ACQUIRED ABSENCE OF OTHER SPECIFIED PARTS OF DIGESTIVE TRACT: Chronic | ICD-10-CM

## 2019-01-10 DIAGNOSIS — R10.31 RIGHT LOWER QUADRANT PAIN: ICD-10-CM

## 2019-01-10 PROCEDURE — 74177 CT ABD & PELVIS W/CONTRAST: CPT | Mod: 26

## 2019-01-10 PROCEDURE — 82565 ASSAY OF CREATININE: CPT

## 2019-01-10 PROCEDURE — 74177 CT ABD & PELVIS W/CONTRAST: CPT

## 2019-02-01 ENCOUNTER — APPOINTMENT (OUTPATIENT)
Dept: FAMILY MEDICINE | Facility: CLINIC | Age: 79
End: 2019-02-01

## 2019-02-13 ENCOUNTER — RX RENEWAL (OUTPATIENT)
Age: 79
End: 2019-02-13

## 2019-03-27 ENCOUNTER — RX RENEWAL (OUTPATIENT)
Age: 79
End: 2019-03-27

## 2019-06-27 ENCOUNTER — RX RENEWAL (OUTPATIENT)
Age: 79
End: 2019-06-27

## 2019-06-27 RX ORDER — EZETIMIBE 10 MG/1
10 TABLET ORAL DAILY
Qty: 90 | Refills: 0 | Status: ACTIVE | COMMUNITY
Start: 2018-12-19 | End: 1900-01-01

## 2019-07-24 ENCOUNTER — RX RENEWAL (OUTPATIENT)
Age: 79
End: 2019-07-24

## 2019-09-16 ENCOUNTER — TRANSCRIPTION ENCOUNTER (OUTPATIENT)
Age: 79
End: 2019-09-16

## 2019-12-09 ENCOUNTER — TRANSCRIPTION ENCOUNTER (OUTPATIENT)
Age: 79
End: 2019-12-09

## 2019-12-09 ENCOUNTER — RX RENEWAL (OUTPATIENT)
Age: 79
End: 2019-12-09

## 2020-03-09 ENCOUNTER — RX RENEWAL (OUTPATIENT)
Age: 80
End: 2020-03-09

## 2020-03-17 ENCOUNTER — APPOINTMENT (OUTPATIENT)
Dept: COLORECTAL SURGERY | Facility: CLINIC | Age: 80
End: 2020-03-17

## 2020-05-29 ENCOUNTER — RX RENEWAL (OUTPATIENT)
Age: 80
End: 2020-05-29

## 2020-06-01 DIAGNOSIS — Z01.818 ENCOUNTER FOR OTHER PREPROCEDURAL EXAMINATION: ICD-10-CM

## 2020-06-02 ENCOUNTER — APPOINTMENT (OUTPATIENT)
Dept: DISASTER EMERGENCY | Facility: CLINIC | Age: 80
End: 2020-06-02

## 2020-06-03 LAB — SARS-COV-2 N GENE NPH QL NAA+PROBE: NOT DETECTED

## 2020-06-04 ENCOUNTER — APPOINTMENT (OUTPATIENT)
Dept: COLORECTAL SURGERY | Facility: AMBULATORY MEDICAL SERVICES | Age: 80
End: 2020-06-04
Payer: MEDICARE

## 2020-06-04 PROCEDURE — 45378 DIAGNOSTIC COLONOSCOPY: CPT

## 2020-07-06 ENCOUNTER — RX RENEWAL (OUTPATIENT)
Age: 80
End: 2020-07-06

## 2020-07-26 ENCOUNTER — RX RENEWAL (OUTPATIENT)
Age: 80
End: 2020-07-26

## 2020-12-14 ENCOUNTER — TRANSCRIPTION ENCOUNTER (OUTPATIENT)
Age: 80
End: 2020-12-14

## 2021-10-13 NOTE — PROGRESS NOTE ADULT - SUBJECTIVE AND OBJECTIVE BOX
Passing large amounts of flatus. Mariah clear liquid diet without N/V/burping or abdominal distention/    MEDICATIONS  (STANDING):  amLODIPine   Tablet 5 milliGRAM(s) Oral daily  ATENolol  Tablet 50 milliGRAM(s) Oral at bedtime  heparin  Injectable 5000 Unit(s) SubCutaneous every 12 hours  pantoprazole  Injectable 40 milliGRAM(s) IV Push daily  sodium chloride 0.9%. 1000 milliLiter(s) (80 mL/Hr) IV Continuous <Continuous>    MEDICATIONS  (PRN):  benzocaine 15 mG/menthol 3.6 mG Lozenge 1 Lozenge Oral every 6 hours PRN Sore Throat  diphenhydrAMINE   Injectable 25 milliGRAM(s) IV Push every 4 hours PRN Pruritus  morphine  - Injectable 2 milliGRAM(s) IV Push every 4 hours PRN Moderate Pain (4 - 6)  naloxone Injectable 0.1 milliGRAM(s) IV Push every 3 minutes PRN For ANY of the following changes in patient status:  A. RR LESS THAN 10 breaths per minute, B. Oxygen saturation LESS THAN 90%, C. Sedation score of 6  ondansetron Injectable 4 milliGRAM(s) IV Push every 6 hours PRN Nausea  ondansetron Injectable 4 milliGRAM(s) IV Push every 6 hours PRN Nausea  prochlorperazine   Injectable 10 milliGRAM(s) IV Push every 6 hours PRN Nausea, if ondansetron is ineffective    Vital Signs Last 24 Hrs  T(C): 36.6 (24 Dec 2017 04:25), Max: 36.7 (23 Dec 2017 13:38)  T(F): 97.9 (24 Dec 2017 04:25), Max: 98.1 (23 Dec 2017 13:38)  HR: 83 (24 Dec 2017 04:25) (62 - 83)  BP: 158/85 (24 Dec 2017 04:25) (119/65 - 158/85)  BP(mean): --  RR: 18 (24 Dec 2017 04:25) (16 - 18)  SpO2: 99% (24 Dec 2017 04:25) (95% - 99%)  I&O's Detail    23 Dec 2017 07:01  -  24 Dec 2017 07:00  --------------------------------------------------------  IN:    sodium chloride 0.9%.: 1380 mL  Total IN: 1380 mL    OUT:    Voided: 4075 mL  Total OUT: 4075 mL    Total NET: -2695 mL      ABD exam :soft, NT, mild distention                        12.6   5.4   )-----------( 146      ( 24 Dec 2017 05:47 )             37.8     12-24    142  |  105  |  7   ----------------------------<  107<H>  3.6   |  30  |  0.57    Ca    8.6      24 Dec 2017 05:47  Phos  2.3     12-24  Mg     1.7     12-24 Additional Comments: Pt brought medication

## 2021-10-19 NOTE — DISCHARGE NOTE ADULT - CLICK TO LAUNCH ORM
I have reviewed the surgical (or preoperative) H&P that is linked to this encounter, and examined the patient. There are no significant changes   .

## 2022-01-13 ENCOUNTER — NON-APPOINTMENT (OUTPATIENT)
Age: 82
End: 2022-01-13

## 2022-01-18 ENCOUNTER — APPOINTMENT (OUTPATIENT)
Dept: COLORECTAL SURGERY | Facility: CLINIC | Age: 82
End: 2022-01-18
Payer: MEDICARE

## 2022-01-18 DIAGNOSIS — Z85.048 PERSONAL HISTORY OF OTHER MALIGNANT NEOPLASM OF RECTUM, RECTOSIGMOID JUNCTION, AND ANUS: ICD-10-CM

## 2022-01-18 DIAGNOSIS — R19.4 CHANGE IN BOWEL HABIT: ICD-10-CM

## 2022-01-18 PROCEDURE — 99214 OFFICE O/P EST MOD 30 MIN: CPT

## 2022-01-18 NOTE — PHYSICAL EXAM
[Abdomen Masses] : No abdominal masses [Abdomen Tenderness] : ~T No ~M abdominal tenderness [Tender] : nontender [Normal rectal exam] : exam was normal [None] : there was no rectal mass  [JVD] : no jugular venous distention  [Normal Breath Sounds] : Normal breath sounds [Normal Heart Sounds] : normal heart sounds [Normal Rate and Rhythm] : normal rate and rhythm [No Rash or Lesion] : No rash or lesion [Alert] : alert [Oriented to Person] : oriented to person [Oriented to Place] : oriented to place [Oriented to Time] : oriented to time [Calm] : calm [de-identified] : Looks well in no distress, of stated age. [de-identified] : pupils equal reactive to light normocephalic atraumatic. [de-identified] : moves all 4 extremities appropriately with 5 over 5 strength

## 2022-01-18 NOTE — ASSESSMENT
[FreeTextEntry1] : 81-year-old male with previous history of colon/rectal cancer N. worsening changes in bowel habits.  Recommend colonoscopy. Risks and benefits of colonoscopy have been discussed which include but not limited to bleeding, perforation, missing a cancer or polyp occurring 5%.\par Recommend high fiber diet, Metamucil daily, sitz baths, stool softeners, pain medications p.r.n.

## 2022-01-18 NOTE — REVIEW OF SYSTEMS
[As Noted in HPI] : as noted in HPI [Constipation] : constipation [Diarrhea] : diarrhea [Negative] : Heme/Lymph [FreeTextEntry7] : Changes in bowel habits

## 2022-01-18 NOTE — HISTORY OF PRESENT ILLNESS
[FreeTextEntry1] : 81-year-old male with history of colon/rectal cancer post resection. Doing very but has changes in bowel habits, Symptoms have been worsening

## 2022-02-25 ENCOUNTER — APPOINTMENT (OUTPATIENT)
Dept: COLORECTAL SURGERY | Facility: CLINIC | Age: 82
End: 2022-02-25
Payer: MEDICARE

## 2022-02-25 PROCEDURE — 45378 DIAGNOSTIC COLONOSCOPY: CPT

## 2023-09-17 NOTE — DISCHARGE NOTE ADULT - HOSPITAL COURSE
moderate Mr. Gorman was admitted with a SBO on 12/6/17. Over the course of the next 4 days, he underwent NGT decompression with gradual return of bowel function as evidenced by passage of flatus. By 12/11/17 repeat CT A/P demonstrated resolution. As a result, his NGT was removed, his diet gradually advanced and on 12/13/17, he was tolerating solid food, passing gas and stool without difficulties. His labwork, vital signs and PE were all WNL.

## 2023-10-08 NOTE — DISCHARGE NOTE ADULT - DO YOU HAVE DIFFICULTY CLIMBING STAIRS
[address], [place] [address] [address], [place] [address] [address], [place] [address] No

## 2023-11-13 ENCOUNTER — RX ONLY (RX ONLY)
Age: 83
End: 2023-11-13

## 2023-11-13 ENCOUNTER — OFFICE (OUTPATIENT)
Facility: LOCATION | Age: 83
Setting detail: OPHTHALMOLOGY
End: 2023-11-13
Payer: MEDICARE

## 2023-11-13 DIAGNOSIS — H43.393: ICD-10-CM

## 2023-11-13 DIAGNOSIS — H01.005: ICD-10-CM
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
## 2023-11-13 DIAGNOSIS — H01.001: ICD-10-CM

## 2023-11-13 DIAGNOSIS — H18.513: ICD-10-CM

## 2023-11-13 DIAGNOSIS — H35.373: ICD-10-CM

## 2023-11-13 DIAGNOSIS — Z13.5: ICD-10-CM

## 2023-11-13 DIAGNOSIS — H01.004: ICD-10-CM

## 2023-11-13 DIAGNOSIS — H01.002: ICD-10-CM

## 2023-11-13 PROBLEM — Z96.1 PSEUDOPHAKIA ; BOTH EYES: Status: ACTIVE | Noted: 2023-11-13

## 2023-11-13 PROBLEM — H18.593 OTHER HEREDITARY CORNEAL DYSTROPHIES; BOTH EYES: Status: ACTIVE | Noted: 2023-11-13

## 2023-11-13 PROCEDURE — 92015 DETERMINE REFRACTIVE STATE: CPT | Performed by: OPHTHALMOLOGY

## 2023-11-13 PROCEDURE — 92134 CPTRZ OPH DX IMG PST SGM RTA: CPT | Performed by: OPHTHALMOLOGY

## 2023-11-13 PROCEDURE — OPTOS FUNDUS PHOTOGRAPHY - NO FINDINGS: Performed by: OPHTHALMOLOGY

## 2023-11-13 PROCEDURE — 92014 COMPRE OPH EXAM EST PT 1/>: CPT | Performed by: OPHTHALMOLOGY

## 2023-11-13 PROCEDURE — 92250 FUNDUS PHOTOGRAPHY W/I&R: CPT | Mod: GY | Performed by: OPHTHALMOLOGY

## 2023-11-13 ASSESSMENT — REFRACTION_CURRENTRX
OD_OVR_VA: 20/
OD_ADD: +2.50
OD_AXIS: 152
OS_CYLINDER: +1.00
OS_OVR_VA: 20/
OS_ADD: +2.50
OD_SPHERE: -0.75
OS_SPHERE: -1.25
OD_CYLINDER: +1.75
OS_AXIS: 031

## 2023-11-13 ASSESSMENT — REFRACTION_MANIFEST
OS_CYLINDER: +1.00
OS_SPHERE: -1.00
OD_VA1: 20/30
OD_CYLINDER: +1.75
OD_SPHERE: -0.75
OS_VA1: 20/20-2
OD_AXIS: 152
OS_AXIS: 30

## 2023-11-13 ASSESSMENT — LID EXAM ASSESSMENTS
OD_BLEPHARITIS: RLL RUL T
OS_BLEPHARITIS: LLL LUL T

## 2023-11-13 ASSESSMENT — REFRACTION_AUTOREFRACTION
OS_SPHERE: -1.00
OD_SPHERE: -0.75
OD_AXIS: 154
OS_AXIS: 032
OD_CYLINDER: +2.75
OS_CYLINDER: +1.00

## 2023-11-13 ASSESSMENT — SPHEQUIV_DERIVED
OD_SPHEQUIV: 0.625
OS_SPHEQUIV: -0.5
OS_SPHEQUIV: -0.5
OD_SPHEQUIV: 0.125

## 2023-11-13 ASSESSMENT — CORNEAL DYSTROPHY - POSTERIOR
OS_POSTERIORDYSTROPHY: GUTTATA
OD_POSTERIORDYSTROPHY: GUTTATA

## 2023-11-13 ASSESSMENT — CONFRONTATIONAL VISUAL FIELD TEST (CVF)
OD_FINDINGS: FULL
OS_FINDINGS: FULL

## 2023-11-27 ENCOUNTER — APPOINTMENT (OUTPATIENT)
Dept: PULMONOLOGY | Facility: CLINIC | Age: 83
End: 2023-11-27
Payer: MEDICARE

## 2023-11-27 VITALS
TEMPERATURE: 96.8 F | OXYGEN SATURATION: 95 % | DIASTOLIC BLOOD PRESSURE: 88 MMHG | SYSTOLIC BLOOD PRESSURE: 150 MMHG | HEART RATE: 60 BPM | WEIGHT: 222 LBS | HEIGHT: 69 IN | BODY MASS INDEX: 32.88 KG/M2

## 2023-11-27 DIAGNOSIS — R06.81 APNEA, NOT ELSEWHERE CLASSIFIED: ICD-10-CM

## 2023-11-27 PROCEDURE — 94727 GAS DIL/WSHOT DETER LNG VOL: CPT

## 2023-11-27 PROCEDURE — 99204 OFFICE O/P NEW MOD 45 MIN: CPT | Mod: 25

## 2023-11-27 PROCEDURE — 94729 DIFFUSING CAPACITY: CPT

## 2023-11-27 PROCEDURE — 94010 BREATHING CAPACITY TEST: CPT

## 2023-11-27 RX ORDER — ASCORBIC ACID/BIOFLAVONOIDS 500-200 MG
TABLET ORAL
Refills: 0 | Status: ACTIVE | COMMUNITY

## 2023-11-27 RX ORDER — ATENOLOL 50 MG/1
TABLET ORAL
Refills: 0 | Status: ACTIVE | COMMUNITY

## 2023-11-27 RX ORDER — VIT A/VIT C/VIT E/ZINC/COPPER 2148-113
TABLET ORAL
Refills: 0 | Status: ACTIVE | COMMUNITY

## 2023-11-27 RX ORDER — POLYETHYLENE GLYCOL 3350 AND ELECTROLYTES WITH LEMON FLAVOR 236; 22.74; 6.74; 5.86; 2.97 G/4L; G/4L; G/4L; G/4L; G/4L
236 POWDER, FOR SOLUTION ORAL
Qty: 1 | Refills: 0 | Status: DISCONTINUED | COMMUNITY
Start: 2022-01-19 | End: 2023-11-27

## 2023-11-27 RX ORDER — GALANTAMINE 16 MG/1
16 CAPSULE, EXTENDED RELEASE ORAL
Refills: 0 | Status: ACTIVE | COMMUNITY

## 2023-12-07 ENCOUNTER — NON-APPOINTMENT (OUTPATIENT)
Age: 83
End: 2023-12-07

## 2023-12-28 ENCOUNTER — APPOINTMENT (OUTPATIENT)
Dept: PULMONOLOGY | Facility: CLINIC | Age: 83
End: 2023-12-28
Payer: MEDICARE

## 2023-12-28 VITALS
HEIGHT: 69 IN | TEMPERATURE: 98 F | DIASTOLIC BLOOD PRESSURE: 80 MMHG | HEART RATE: 63 BPM | BODY MASS INDEX: 33.18 KG/M2 | WEIGHT: 224 LBS | OXYGEN SATURATION: 97 % | SYSTOLIC BLOOD PRESSURE: 122 MMHG

## 2023-12-28 DIAGNOSIS — R09.82 POSTNASAL DRIP: ICD-10-CM

## 2023-12-28 DIAGNOSIS — R05.9 COUGH, UNSPECIFIED: ICD-10-CM

## 2023-12-28 PROCEDURE — 99214 OFFICE O/P EST MOD 30 MIN: CPT

## 2023-12-28 NOTE — HISTORY OF PRESENT ILLNESS
[Former] : former [Never] : never [TextBox_4] : 83 male hx sleep apnea  and had 2  home sleep study and told inconclusive today feels good notes dec cough with atrovent nasal spray occ tickly in throat  no chest pain no tightness  no dyspnea on exertion  no wheeze  no snore  no snort  no am dry mouth or headache feels refreshed in am no daytime nap [TextBox_11] : 1 [TextBox_13] : 28 [YearQuit] : 1982

## 2023-12-28 NOTE — DISCUSSION/SUMMARY
[FreeTextEntry1] : Mr. Gorman is feeling better on the nasal spray.  We will continue therapy.  His cough is minimal.  The patient denies all symptoms of sleep apnea at this time although he had severe apnea prior.  I await the home sleep studies.  If they are not negative then likely no further studies as he denies symptoms.  This is been discussed with the patient he understands and agrees. The patient understands and agrees with plan of care. Today's office visit encompassed 32 minutes. I conducted an extensive history, physical exam and reviewed diagnosis and treatment options including diagnostic tests,radiology studies including cat scans and the use of prescription medication.

## 2023-12-28 NOTE — REASON FOR VISIT
[Follow-Up] : a follow-up visit [Sleep Apnea] : sleep apnea [Cough] : cough [TextBox_44] : 1 month. Pt states that he has improved since his last visit. Sleep Study was done on 12/18/23. Spoke to [facility] from sleep lab for results: it was 2 inconclusive studies, sleep center reccomends in house study.

## 2024-01-11 RX ORDER — IPRATROPIUM BROMIDE 42 UG/1
0.06 SPRAY NASAL 3 TIMES DAILY
Qty: 1 | Refills: 6 | Status: ACTIVE | COMMUNITY
Start: 2023-11-27 | End: 1900-01-01

## 2024-01-31 ENCOUNTER — NON-APPOINTMENT (OUTPATIENT)
Age: 84
End: 2024-01-31

## 2024-02-08 ENCOUNTER — OUTPATIENT (OUTPATIENT)
Dept: OUTPATIENT SERVICES | Facility: HOSPITAL | Age: 84
LOS: 1 days | End: 2024-02-08
Payer: MEDICARE

## 2024-02-08 DIAGNOSIS — G47.33 OBSTRUCTIVE SLEEP APNEA (ADULT) (PEDIATRIC): ICD-10-CM

## 2024-02-08 DIAGNOSIS — Z90.49 ACQUIRED ABSENCE OF OTHER SPECIFIED PARTS OF DIGESTIVE TRACT: Chronic | ICD-10-CM

## 2024-02-08 DIAGNOSIS — Z98.890 OTHER SPECIFIED POSTPROCEDURAL STATES: Chronic | ICD-10-CM

## 2024-02-08 PROCEDURE — 95810 POLYSOM 6/> YRS 4/> PARAM: CPT

## 2024-02-08 PROCEDURE — 95810 POLYSOM 6/> YRS 4/> PARAM: CPT | Mod: 26

## 2024-02-21 ENCOUNTER — APPOINTMENT (OUTPATIENT)
Dept: PULMONOLOGY | Facility: CLINIC | Age: 84
End: 2024-02-21
Payer: MEDICARE

## 2024-02-21 VITALS
HEART RATE: 64 BPM | SYSTOLIC BLOOD PRESSURE: 124 MMHG | DIASTOLIC BLOOD PRESSURE: 82 MMHG | BODY MASS INDEX: 33.18 KG/M2 | WEIGHT: 224 LBS | OXYGEN SATURATION: 98 % | TEMPERATURE: 98 F | HEIGHT: 69 IN

## 2024-02-21 DIAGNOSIS — G47.33 OBSTRUCTIVE SLEEP APNEA (ADULT) (PEDIATRIC): ICD-10-CM

## 2024-02-21 PROCEDURE — 99214 OFFICE O/P EST MOD 30 MIN: CPT

## 2024-02-21 NOTE — HISTORY OF PRESENT ILLNESS
[Former] : former [Never] : never [TextBox_4] : 83 male hx  hypersomnolence  pt home study inclusive so had in lab study no snore feels sl tired during day and no definite nap during day co dry mouth in am [TextBox_11] : 1 [TextBox_13] : 28 [YearQuit] : 1982

## 2024-02-21 NOTE — REASON FOR VISIT
[Follow-Up] : a follow-up visit [Sleep Evaluation] : sleep evaluation [TextEntry] : 2 month. Patient is present for his results for his sleep study. Patient has no pulmonary complains.

## 2024-02-21 NOTE — PROCEDURE
[FreeTextEntry1] : In lab sleep study February 8, 2024 AHI 61.5.  Lam O2 saturation 65%.  236 obstructive apneas with 38 hypopneas and 24 central apneas.

## 2024-02-21 NOTE — DISCUSSION/SUMMARY
[FreeTextEntry1] : Mr. Laurent has severe obstructive sleep apnea.  He has minimal symptoms but clearly severe disease.  He also has a history of heart disease.  He must be treated.  I discussed with him APAP therapy and he agrees.. discussed obstructive sleep apnea at length Pt advised weight loss Pt instructed not to use alcohol with in 4 hrs of sleep Pt instructed not to drive or use machinery if very tired The patient must use the APAP machine 70% of nights for at least 4 hours a night to be effective. They must use distilled water in humidifier. We will be checking patient compliance on their next visit will initiate apap therapy Patient understands and all questions answered  The patient understands and agrees with plan of care. Today's office visit encompassed 32 minutes. I conducted an extensive history, physical exam and reviewed diagnosis and treatment options including diagnostic tests,radiology studies including cat scans and the use of prescription medication.

## 2024-05-28 ENCOUNTER — NON-APPOINTMENT (OUTPATIENT)
Age: 84
End: 2024-05-28

## 2024-06-04 ENCOUNTER — APPOINTMENT (OUTPATIENT)
Dept: PULMONOLOGY | Facility: CLINIC | Age: 84
End: 2024-06-04
Payer: MEDICARE

## 2024-06-04 VITALS
BODY MASS INDEX: 33.18 KG/M2 | SYSTOLIC BLOOD PRESSURE: 102 MMHG | TEMPERATURE: 97.6 F | DIASTOLIC BLOOD PRESSURE: 68 MMHG | WEIGHT: 224 LBS | OXYGEN SATURATION: 94 % | HEIGHT: 69 IN | HEART RATE: 60 BPM

## 2024-06-04 DIAGNOSIS — G47.33 OBSTRUCTIVE SLEEP APNEA (ADULT) (PEDIATRIC): ICD-10-CM

## 2024-06-04 PROCEDURE — 99214 OFFICE O/P EST MOD 30 MIN: CPT

## 2024-06-04 RX ORDER — PANTOPRAZOLE SODIUM 40 MG/10ML
INJECTION, POWDER, FOR SOLUTION INTRAVENOUS
Refills: 0 | Status: DISCONTINUED | COMMUNITY
End: 2024-06-04

## 2024-06-04 RX ORDER — ROSUVASTATIN CALCIUM 5 MG/1
TABLET, FILM COATED ORAL
Refills: 0 | Status: ACTIVE | COMMUNITY

## 2024-06-04 RX ORDER — PRAVASTATIN SODIUM 80 MG/1
TABLET ORAL
Refills: 0 | Status: DISCONTINUED | COMMUNITY
End: 2024-06-04

## 2024-06-04 RX ORDER — ISOSORBIDE MONONITRATE 20 MG/1
TABLET ORAL
Refills: 0 | Status: ACTIVE | COMMUNITY

## 2024-06-04 NOTE — DISCUSSION/SUMMARY
[FreeTextEntry1] : Mr. Gorman is using his CPAP properly.  He still feels tired during the day.  Although he feels much better than prior.  The compliance report is excellent with his AHI decreased from 61-2.5.  I discussed with him the fact that he has change in his atenolol dosing and that beta-blockers can cause tiredness.  We will not change the CPAP at this time but he will speak to Dr. Orr cardiology regarding his cardiac medication. The patient understands and agrees with plan of care. Today's office visit encompassed 32 minutes. I conducted an extensive history, physical exam and reviewed diagnosis and treatment options including diagnostic tests,radiology studies including cat scans and the use of prescription medication.

## 2024-06-04 NOTE — HISTORY OF PRESENT ILLNESS
[Former] : former [Never] : never [Obstructive Sleep Apnea] : obstructive sleep apnea [CPAP:] : CPAP [Nasal mask] : nasal mask [TextBox_4] : 84 male hx obstructive sleep apnea  for fu  suing machine all the time  feels tired upon arising  no snore  dry mouth   on nasal pillows  no nap during day  [TextBox_11] : 1 [TextBox_13] : 28 [YearQuit] : 1982 [TextBox_158] : [employer]

## 2024-06-04 NOTE — REASON FOR VISIT
[Follow-Up] : a follow-up visit [Sleep Apnea] : sleep apnea [TextEntry] : 4 months. Patient has no pulmonary complaints at this time. Patient was at [facility] from 5/23/2024 to 5/242024 for non-pulmonary related issues.

## 2024-06-04 NOTE — PROCEDURE
[FreeTextEntry1] : Compliance report 4/2/2024 to 6/2/2020 2497% of days used 92% of days greater than 4 hours maximum pressure 13 AHI 2.5 is excellent compliance.

## 2024-06-27 ENCOUNTER — APPOINTMENT (OUTPATIENT)
Dept: PULMONOLOGY | Facility: CLINIC | Age: 84
End: 2024-06-27

## 2024-07-16 ENCOUNTER — NON-APPOINTMENT (OUTPATIENT)
Age: 84
End: 2024-07-16

## 2024-11-18 ENCOUNTER — NON-APPOINTMENT (OUTPATIENT)
Age: 84
End: 2024-11-18

## 2024-11-22 ENCOUNTER — NON-APPOINTMENT (OUTPATIENT)
Age: 84
End: 2024-11-22

## 2024-12-05 ENCOUNTER — APPOINTMENT (OUTPATIENT)
Dept: PULMONOLOGY | Facility: CLINIC | Age: 84
End: 2024-12-05
Payer: MEDICARE

## 2024-12-05 VITALS
DIASTOLIC BLOOD PRESSURE: 70 MMHG | WEIGHT: 220 LBS | BODY MASS INDEX: 32.58 KG/M2 | OXYGEN SATURATION: 94 % | SYSTOLIC BLOOD PRESSURE: 128 MMHG | HEIGHT: 69 IN | TEMPERATURE: 97.2 F | HEART RATE: 75 BPM

## 2024-12-05 DIAGNOSIS — G47.33 OBSTRUCTIVE SLEEP APNEA (ADULT) (PEDIATRIC): ICD-10-CM

## 2024-12-05 PROCEDURE — 99214 OFFICE O/P EST MOD 30 MIN: CPT

## 2025-06-05 ENCOUNTER — APPOINTMENT (OUTPATIENT)
Dept: PULMONOLOGY | Facility: CLINIC | Age: 85
End: 2025-06-05
Payer: MEDICARE

## 2025-06-05 VITALS
HEIGHT: 69 IN | HEART RATE: 64 BPM | OXYGEN SATURATION: 96 % | DIASTOLIC BLOOD PRESSURE: 62 MMHG | SYSTOLIC BLOOD PRESSURE: 108 MMHG | TEMPERATURE: 98 F | BODY MASS INDEX: 32.58 KG/M2 | WEIGHT: 220 LBS

## 2025-06-05 DIAGNOSIS — G47.33 OBSTRUCTIVE SLEEP APNEA (ADULT) (PEDIATRIC): ICD-10-CM

## 2025-06-05 PROCEDURE — 99214 OFFICE O/P EST MOD 30 MIN: CPT

## 2025-06-12 ENCOUNTER — OFFICE (OUTPATIENT)
Facility: LOCATION | Age: 85
Setting detail: OPHTHALMOLOGY
End: 2025-06-12
Payer: MEDICARE

## 2025-06-12 DIAGNOSIS — H18.513: ICD-10-CM

## 2025-06-12 DIAGNOSIS — H01.005: ICD-10-CM

## 2025-06-12 DIAGNOSIS — H01.001: ICD-10-CM

## 2025-06-12 DIAGNOSIS — H01.002: ICD-10-CM

## 2025-06-12 DIAGNOSIS — H35.373: ICD-10-CM

## 2025-06-12 DIAGNOSIS — H01.004: ICD-10-CM

## 2025-06-12 DIAGNOSIS — H35.3131: ICD-10-CM

## 2025-06-12 PROCEDURE — 92014 COMPRE OPH EXAM EST PT 1/>: CPT | Performed by: OPHTHALMOLOGY

## 2025-06-12 PROCEDURE — 92250 FUNDUS PHOTOGRAPHY W/I&R: CPT | Performed by: OPHTHALMOLOGY

## 2025-06-12 ASSESSMENT — REFRACTION_AUTOREFRACTION
OS_CYLINDER: +1.00
OS_AXIS: 028
OD_CYLINDER: +2.75
OD_AXIS: 152
OD_SPHERE: -1.25
OS_SPHERE: -1.25

## 2025-06-12 ASSESSMENT — VISUAL ACUITY
OD_BCVA: 20/20
OS_BCVA: 20/25

## 2025-06-12 ASSESSMENT — REFRACTION_MANIFEST
OS_CYLINDER: +1.00
OS_SPHERE: -1.00
OS_AXIS: 30
OD_AXIS: 152
OD_SPHERE: -0.75
OD_CYLINDER: +1.75
OD_VA1: 20/30
OS_VA1: 20/20-2

## 2025-06-12 ASSESSMENT — REFRACTION_CURRENTRX
OS_ADD: +2.50
OD_SPHERE: -0.75
OS_SPHERE: -1.25
OD_OVR_VA: 20/
OS_CYLINDER: +1.00
OS_VPRISM_DIRECTION: PROGS
OS_AXIS: 031
OD_CYLINDER: +1.75
OD_AXIS: 152
OD_ADD: +2.50
OS_OVR_VA: 20/
OD_VPRISM_DIRECTION: PROGS

## 2025-06-12 ASSESSMENT — KERATOMETRY
OS_K2POWER_DIOPTERS: 42.50
OS_K1POWER_DIOPTERS: 41.75
OS_AXISANGLE_DEGREES: 044
OD_AXISANGLE_DEGREES: 140
OD_K2POWER_DIOPTERS: 44.50
OD_K1POWER_DIOPTERS: 41.25

## 2025-06-12 ASSESSMENT — TONOMETRY
OS_IOP_MMHG: 14
OD_IOP_MMHG: 14

## 2025-06-12 ASSESSMENT — LID EXAM ASSESSMENTS
OS_BLEPHARITIS: LLL LUL T
OD_BLEPHARITIS: RLL RUL T

## 2025-06-12 ASSESSMENT — CONFRONTATIONAL VISUAL FIELD TEST (CVF)
OD_FINDINGS: FULL
OS_FINDINGS: FULL

## 2025-06-12 ASSESSMENT — CORNEAL DYSTROPHY - POSTERIOR
OD_POSTERIORDYSTROPHY: GUTTATA
OS_POSTERIORDYSTROPHY: GUTTATA